# Patient Record
Sex: FEMALE | Race: WHITE | Employment: UNEMPLOYED | URBAN - METROPOLITAN AREA
[De-identification: names, ages, dates, MRNs, and addresses within clinical notes are randomized per-mention and may not be internally consistent; named-entity substitution may affect disease eponyms.]

---

## 2018-03-14 ENCOUNTER — HOSPITAL ENCOUNTER (EMERGENCY)
Age: 33
Discharge: LEFT BEFORE DISCHARGE | End: 2018-03-14
Payer: MEDICAID

## 2018-03-14 VITALS
DIASTOLIC BLOOD PRESSURE: 71 MMHG | BODY MASS INDEX: 21.71 KG/M2 | TEMPERATURE: 97.5 F | WEIGHT: 118 LBS | HEART RATE: 78 BPM | RESPIRATION RATE: 12 BRPM | HEIGHT: 62 IN | SYSTOLIC BLOOD PRESSURE: 101 MMHG | OXYGEN SATURATION: 98 %

## 2018-10-03 ENCOUNTER — HOSPITAL ENCOUNTER (EMERGENCY)
Age: 33
Discharge: HOME | End: 2018-10-03
Attending: FAMILY MEDICINE
Payer: MEDICAID

## 2018-10-03 VITALS
WEIGHT: 117 LBS | TEMPERATURE: 98.4 F | OXYGEN SATURATION: 97 % | BODY MASS INDEX: 21.53 KG/M2 | RESPIRATION RATE: 16 BRPM | HEIGHT: 62 IN | SYSTOLIC BLOOD PRESSURE: 102 MMHG | HEART RATE: 80 BPM | DIASTOLIC BLOOD PRESSURE: 58 MMHG

## 2018-10-03 DIAGNOSIS — N30.00 ACUTE CYSTITIS WITHOUT HEMATURIA: ICD-10-CM

## 2018-10-03 DIAGNOSIS — R30.0 DYSURIA: Primary | ICD-10-CM

## 2018-10-03 LAB
BACTERIA: ABNORMAL /HPF
BILIRUBIN URINE: ABNORMAL
BLOOD, URINE: ABNORMAL
CLARITY: ABNORMAL
COLOR: ABNORMAL
GLUCOSE URINE: NEGATIVE MG/DL
HCG(URINE) PREGNANCY TEST: NEGATIVE
KETONES, URINE: ABNORMAL MG/DL
LEUKOCYTE ESTERASE, URINE: ABNORMAL
NITRITE, URINE: POSITIVE
PH UA: 6 (ref 5–9)
PROTEIN UA: >=300 MG/DL
RBC UA: >20 /HPF (ref 0–2)
SPECIFIC GRAVITY UA: >=1.03 (ref 1–1.03)
UROBILINOGEN, URINE: 1 E.U./DL
WBC UA: >20 /HPF (ref 0–5)

## 2018-10-03 PROCEDURE — 81001 URINALYSIS AUTO W/SCOPE: CPT

## 2018-10-03 PROCEDURE — 99283 EMERGENCY DEPT VISIT LOW MDM: CPT

## 2018-10-03 PROCEDURE — 81025 URINE PREGNANCY TEST: CPT

## 2018-10-03 RX ORDER — ALPRAZOLAM 1 MG/1
2 TABLET ORAL 2 TIMES DAILY
COMMUNITY

## 2018-10-03 RX ORDER — SULFAMETHOXAZOLE AND TRIMETHOPRIM 800; 160 MG/1; MG/1
1 TABLET ORAL 2 TIMES DAILY
Qty: 10 TABLET | Refills: 0 | Status: SHIPPED | OUTPATIENT
Start: 2018-10-03 | End: 2018-10-08

## 2018-10-03 ASSESSMENT — ENCOUNTER SYMPTOMS: BACK PAIN: 0

## 2018-10-03 ASSESSMENT — PAIN - FUNCTIONAL ASSESSMENT: PAIN_FUNCTIONAL_ASSESSMENT: 0-10

## 2018-10-03 ASSESSMENT — PAIN DESCRIPTION - PAIN TYPE: TYPE: ACUTE PAIN

## 2018-10-03 ASSESSMENT — PAIN SCALES - GENERAL: PAINLEVEL_OUTOF10: 5

## 2018-10-03 NOTE — ED PROVIDER NOTES
medications have been reviewed. Allergies: Patient has no known allergies. -------------------------------------------------- RESULTS -------------------------------------------------  No results found for this visit on 10/03/18. No orders to display       ------------------------- NURSING NOTES AND VITALS REVIEWED ---------------------------   The nursing notes within the ED encounter and vital signs as below have been reviewed. BP (!) 102/58   Pulse 80   Temp 98.4 °F (36.9 °C) (Oral)   Resp 16   Ht 5' 2\" (1.575 m)   Wt 117 lb (53.1 kg)   LMP 10/03/2018   SpO2 97%   Breastfeeding? No   BMI 21.40 kg/m²   Oxygen Saturation Interpretation: Normal      ------------------------------------------ PROGRESS NOTES ------------------------------------------   I have spoken with the patient and discussed todays results, in addition to providing specific details for the plan of care and counseling regarding the diagnosis and prognosis. Their questions are answered at this time and they are agreeable with the plan.      --------------------------------- ADDITIONAL PROVIDER NOTES ---------------------------------     1. Dysuria    2. Acute cystitis without hematuria           This patient is stable for discharge. I have shared the specific conditions for return, as well as the importance of follow-up.              Es Overall, DO  10/03/18 5299

## 2018-10-08 ENCOUNTER — HOSPITAL ENCOUNTER (EMERGENCY)
Age: 33
Discharge: HOME | End: 2018-10-08
Payer: MEDICAID

## 2018-10-08 VITALS
WEIGHT: 117 LBS | HEIGHT: 62 IN | OXYGEN SATURATION: 99 % | DIASTOLIC BLOOD PRESSURE: 49 MMHG | BODY MASS INDEX: 21.53 KG/M2 | TEMPERATURE: 97 F | RESPIRATION RATE: 17 BRPM | SYSTOLIC BLOOD PRESSURE: 96 MMHG | HEART RATE: 76 BPM

## 2018-10-08 DIAGNOSIS — B02.9 HERPES ZOSTER WITHOUT COMPLICATION: Primary | ICD-10-CM

## 2018-10-08 PROCEDURE — 99282 EMERGENCY DEPT VISIT SF MDM: CPT

## 2018-10-08 RX ORDER — ACYCLOVIR 800 MG/1
800 TABLET ORAL
Qty: 50 TABLET | Refills: 0 | Status: SHIPPED | OUTPATIENT
Start: 2018-10-08 | End: 2018-10-18

## 2018-10-08 RX ORDER — OXYCODONE HYDROCHLORIDE AND ACETAMINOPHEN 5; 325 MG/1; MG/1
1 TABLET ORAL EVERY 6 HOURS PRN
Qty: 10 TABLET | Refills: 0 | Status: SHIPPED | OUTPATIENT
Start: 2018-10-08 | End: 2018-10-11

## 2018-10-08 ASSESSMENT — PAIN DESCRIPTION - PAIN TYPE: TYPE: ACUTE PAIN

## 2018-10-08 ASSESSMENT — PAIN DESCRIPTION - LOCATION: LOCATION: FACE

## 2018-10-08 ASSESSMENT — PAIN SCALES - GENERAL: PAINLEVEL_OUTOF10: 7

## 2019-05-31 ENCOUNTER — HOSPITAL ENCOUNTER (EMERGENCY)
Age: 34
Discharge: HOME | End: 2019-05-31
Payer: MEDICAID

## 2019-05-31 VITALS
DIASTOLIC BLOOD PRESSURE: 64 MMHG | RESPIRATION RATE: 18 BRPM | TEMPERATURE: 97.7 F | SYSTOLIC BLOOD PRESSURE: 113 MMHG | OXYGEN SATURATION: 93 % | HEART RATE: 76 BPM

## 2019-05-31 DIAGNOSIS — N39.0 URINARY TRACT INFECTION WITH HEMATURIA, SITE UNSPECIFIED: Primary | ICD-10-CM

## 2019-05-31 DIAGNOSIS — R31.9 URINARY TRACT INFECTION WITH HEMATURIA, SITE UNSPECIFIED: Primary | ICD-10-CM

## 2019-05-31 LAB
BACTERIA: ABNORMAL /HPF
BILIRUBIN URINE: NEGATIVE
BLOOD, URINE: ABNORMAL
CLARITY: CLEAR
COLOR: YELLOW
EPITHELIAL CELLS, UA: ABNORMAL /HPF
GLUCOSE URINE: NEGATIVE MG/DL
HCG, URINE, POC: NEGATIVE
KETONES, URINE: NEGATIVE MG/DL
LEUKOCYTE ESTERASE, URINE: ABNORMAL
Lab: NORMAL
NEGATIVE QC PASS/FAIL: NORMAL
NITRITE, URINE: NEGATIVE
PH UA: 6 (ref 5–9)
POSITIVE QC PASS/FAIL: NORMAL
PROTEIN UA: NEGATIVE MG/DL
RBC UA: ABNORMAL /HPF (ref 0–2)
SPECIFIC GRAVITY UA: 1.01 (ref 1–1.03)
UROBILINOGEN, URINE: 0.2 E.U./DL
WBC UA: >20 /HPF (ref 0–5)

## 2019-05-31 PROCEDURE — 99283 EMERGENCY DEPT VISIT LOW MDM: CPT

## 2019-05-31 PROCEDURE — 87088 URINE BACTERIA CULTURE: CPT

## 2019-05-31 PROCEDURE — 87186 SC STD MICRODIL/AGAR DIL: CPT

## 2019-05-31 PROCEDURE — 6370000000 HC RX 637 (ALT 250 FOR IP): Performed by: NURSE PRACTITIONER

## 2019-05-31 PROCEDURE — 81001 URINALYSIS AUTO W/SCOPE: CPT

## 2019-05-31 RX ORDER — CEFDINIR 300 MG/1
300 CAPSULE ORAL 2 TIMES DAILY
Qty: 14 CAPSULE | Refills: 0 | Status: SHIPPED | OUTPATIENT
Start: 2019-05-31 | End: 2019-06-07

## 2019-05-31 RX ORDER — CEFDINIR 300 MG/1
300 CAPSULE ORAL ONCE
Status: COMPLETED | OUTPATIENT
Start: 2019-05-31 | End: 2019-05-31

## 2019-05-31 RX ORDER — PHENAZOPYRIDINE HYDROCHLORIDE 100 MG/1
100 TABLET, FILM COATED ORAL 3 TIMES DAILY PRN
Qty: 9 TABLET | Refills: 0 | Status: SHIPPED | OUTPATIENT
Start: 2019-05-31 | End: 2019-06-03

## 2019-05-31 RX ADMIN — CEFDINIR 300 MG: 300 CAPSULE ORAL at 22:47

## 2019-06-02 LAB
ORGANISM: ABNORMAL
URINE CULTURE, ROUTINE: ABNORMAL
URINE CULTURE, ROUTINE: ABNORMAL

## 2019-06-05 NOTE — ED PROVIDER NOTES
Independent   HPI:  6/5/19, Time: 6:51 AM         Charlene Landeros is a 26 y.o. female presenting to the ED for concerns regarding urinary frequency. Patient reports her last day she's been having urinary frequency. States that she will void small amounts. Initially she reported to nursing staff that she is having urinary retention despite going to the bathroom 3 hours prior. Patient denies any back pain denies any abdominal pain denies any fevers or chills. Review of Systems:   Pertinent positives and negatives are stated within HPI, all other systems reviewed and are negative.          --------------------------------------------- PAST HISTORY ---------------------------------------------  Past Medical History:  has a past medical history of Bipolar 1 disorder ([facility] [address] Chronic neck pain, and Migraines. Past Surgical History:  has a past surgical history that includes hernia repair. Social History:  reports that she has been smoking cigarettes. She has a 7.50 pack-year smoking history. She has never used smokeless tobacco. She reports that she does not drink alcohol or use drugs. Family History: family history includes Cancer in her paternal grandfather and paternal grandmother; Heart Disease in her maternal grandmother. The patients home medications have been reviewed. Allergies: Patient has no known allergies.     -------------------------------------------------- RESULTS -------------------------------------------------  All laboratory and radiology results have been personally reviewed by myself   LABS:  Results for orders placed or performed during the hospital encounter of 05/31/19   Urine Culture   Result Value Ref Range    Urine Culture, Routine      Organism Escherichia coli (A)     Urine Culture, Routine >100,000 CFU/ml        Susceptibility    Escherichia coli - BACTERIAL SUSCEPTIBILITY PANEL BY MIKALA     ampicillin =^4 Sensitive mcg/mL     ceFAZolin <=^4 Sensitive mcg/mL     cefepime bilaterally, no wheezes, rales, or rhonchi. Not in respiratory distress  Cardiovascular:  Regular rate and rhythm, no murmurs, gallops, or rubs. 2+ distal pulses  Abdomen: Soft, non tender, non distended, negative for CVA tenderness  Extremities: Moves all extremities x 4. Warm and well perfused  Skin: warm and dry without rash  Neurologic: GCS 15,  Psych: Normal Affect      ------------------------------ ED COURSE/MEDICAL DECISION MAKING----------------------  Medications   cefdinir (OMNICEF) capsule 300 mg (300 mg Oral Given 5/31/19 2247)         ED COURSE:       Medical Decision Making: Plan will be for urinalysis. Urine showing moderate leukocytes. It is also showing large amount of blood. Urine pregnancy negative. Patient positive for urinary tract infection. Patient will be started on Omnicef. She was provided with first dose here in the emergency department. Patient will be discharged home with Omnicef and Pyridium. She was educated on safe sex practices and follow-up care regarding urinary tract infection as well as good hydration with water. Patient also made aware when to return back to the emergency department with complete understanding. Patient neurovascularly and hemodynamically intact. Patient safely discharged home       Counseling: The emergency provider has spoken with the patient and discussed todays results, in addition to providing specific details for the plan of care and counseling regarding the diagnosis and prognosis. Questions are answered at this time and they are agreeable with the plan.      --------------------------------- IMPRESSION AND DISPOSITION ---------------------------------    IMPRESSION  1. Urinary tract infection with hematuria, site unspecified        DISPOSITION  Disposition: Discharge to home  Patient condition is good      NOTE: This report was transcribed using voice recognition software.  Every effort was made to ensure accuracy; however, inadvertent computerized transcription errors may be present     SOLOMON Staley - ALEJANDRO  06/05/19 6333

## 2019-11-17 ENCOUNTER — HOSPITAL ENCOUNTER (EMERGENCY)
Age: 34
Discharge: HOME | End: 2019-11-17
Attending: EMERGENCY MEDICINE
Payer: MEDICAID

## 2019-11-17 ENCOUNTER — APPOINTMENT (OUTPATIENT)
Dept: GENERAL RADIOLOGY | Age: 34
End: 2019-11-17
Payer: MEDICAID

## 2019-11-17 VITALS
BODY MASS INDEX: 21.35 KG/M2 | RESPIRATION RATE: 16 BRPM | WEIGHT: 116 LBS | DIASTOLIC BLOOD PRESSURE: 52 MMHG | HEIGHT: 62 IN | OXYGEN SATURATION: 99 % | HEART RATE: 67 BPM | SYSTOLIC BLOOD PRESSURE: 94 MMHG | TEMPERATURE: 97.8 F

## 2019-11-17 DIAGNOSIS — J11.1 INFLUENZA WITH RESPIRATORY MANIFESTATION OTHER THAN PNEUMONIA: Primary | ICD-10-CM

## 2019-11-17 LAB
ANION GAP SERPL CALCULATED.3IONS-SCNC: 9 MMOL/L (ref 7–16)
BUN BLDV-MCNC: 13 MG/DL (ref 6–20)
CALCIUM SERPL-MCNC: 9.2 MG/DL (ref 8.6–10.2)
CHLORIDE BLD-SCNC: 108 MMOL/L (ref 98–107)
CO2: 23 MMOL/L (ref 22–29)
CREAT SERPL-MCNC: 0.6 MG/DL (ref 0.5–1)
GFR AFRICAN AMERICAN: >60
GFR NON-AFRICAN AMERICAN: >60 ML/MIN/1.73
GLUCOSE BLD-MCNC: 97 MG/DL (ref 74–99)
HCT VFR BLD CALC: 36.3 % (ref 34–48)
HEMOGLOBIN: 12.4 G/DL (ref 11.5–15.5)
INFLUENZA A BY PCR: NOT DETECTED
INFLUENZA B BY PCR: DETECTED
MCH RBC QN AUTO: 30.7 PG (ref 26–35)
MCHC RBC AUTO-ENTMCNC: 34.2 % (ref 32–34.5)
MCV RBC AUTO: 89.9 FL (ref 80–99.9)
PDW BLD-RTO: 12.3 FL (ref 11.5–15)
PLATELET # BLD: 144 E9/L (ref 130–450)
PMV BLD AUTO: 11.3 FL (ref 7–12)
POTASSIUM SERPL-SCNC: 4 MMOL/L (ref 3.5–5)
PRO-BNP: 126 PG/ML (ref 0–125)
RBC # BLD: 4.04 E12/L (ref 3.5–5.5)
SODIUM BLD-SCNC: 140 MMOL/L (ref 132–146)
STREP GRP A PCR: NEGATIVE
TROPONIN: <0.01 NG/ML (ref 0–0.03)
WBC # BLD: 4 E9/L (ref 4.5–11.5)

## 2019-11-17 PROCEDURE — 99285 EMERGENCY DEPT VISIT HI MDM: CPT

## 2019-11-17 PROCEDURE — 83880 ASSAY OF NATRIURETIC PEPTIDE: CPT

## 2019-11-17 PROCEDURE — 6360000002 HC RX W HCPCS: Performed by: EMERGENCY MEDICINE

## 2019-11-17 PROCEDURE — 84484 ASSAY OF TROPONIN QUANT: CPT

## 2019-11-17 PROCEDURE — 80048 BASIC METABOLIC PNL TOTAL CA: CPT

## 2019-11-17 PROCEDURE — 85027 COMPLETE CBC AUTOMATED: CPT

## 2019-11-17 PROCEDURE — 96374 THER/PROPH/DIAG INJ IV PUSH: CPT

## 2019-11-17 PROCEDURE — 93005 ELECTROCARDIOGRAM TRACING: CPT | Performed by: PHYSICIAN ASSISTANT

## 2019-11-17 PROCEDURE — 36415 COLL VENOUS BLD VENIPUNCTURE: CPT

## 2019-11-17 PROCEDURE — 87880 STREP A ASSAY W/OPTIC: CPT

## 2019-11-17 PROCEDURE — 71046 X-RAY EXAM CHEST 2 VIEWS: CPT

## 2019-11-17 PROCEDURE — 87502 INFLUENZA DNA AMP PROBE: CPT

## 2019-11-17 PROCEDURE — 94664 DEMO&/EVAL PT USE INHALER: CPT

## 2019-11-17 PROCEDURE — 2500000003 HC RX 250 WO HCPCS: Performed by: EMERGENCY MEDICINE

## 2019-11-17 RX ORDER — PREDNISONE 50 MG/1
TABLET ORAL
Qty: 5 TABLET | Refills: 0 | Status: SHIPPED | OUTPATIENT
Start: 2019-11-17 | End: 2020-03-10 | Stop reason: ALTCHOICE

## 2019-11-17 RX ORDER — ALBUTEROL SULFATE 90 UG/1
2 AEROSOL, METERED RESPIRATORY (INHALATION) EVERY 6 HOURS PRN
Qty: 1 INHALER | Refills: 0 | Status: SHIPPED | OUTPATIENT
Start: 2019-11-17 | End: 2020-09-25

## 2019-11-17 RX ORDER — METHYLPREDNISOLONE SODIUM SUCCINATE 125 MG/2ML
125 INJECTION, POWDER, LYOPHILIZED, FOR SOLUTION INTRAMUSCULAR; INTRAVENOUS ONCE
Status: COMPLETED | OUTPATIENT
Start: 2019-11-17 | End: 2019-11-17

## 2019-11-17 RX ORDER — LIDOCAINE HYDROCHLORIDE 10 MG/ML
5 INJECTION, SOLUTION EPIDURAL; INFILTRATION; INTRACAUDAL; PERINEURAL ONCE
Status: COMPLETED | OUTPATIENT
Start: 2019-11-17 | End: 2019-11-17

## 2019-11-17 RX ADMIN — LIDOCAINE HYDROCHLORIDE 5 ML: 10 INJECTION, SOLUTION EPIDURAL; INFILTRATION; INTRACAUDAL; PERINEURAL at 20:21

## 2019-11-17 RX ADMIN — METHYLPREDNISOLONE SODIUM SUCCINATE 125 MG: 125 INJECTION, POWDER, FOR SOLUTION INTRAMUSCULAR; INTRAVENOUS at 20:14

## 2019-11-17 ASSESSMENT — ENCOUNTER SYMPTOMS
SHORTNESS OF BREATH: 1
BACK PAIN: 0
ABDOMINAL PAIN: 0
COUGH: 1

## 2019-11-18 LAB
EKG ATRIAL RATE: 66 BPM
EKG P AXIS: 40 DEGREES
EKG P-R INTERVAL: 134 MS
EKG Q-T INTERVAL: 388 MS
EKG QRS DURATION: 72 MS
EKG QTC CALCULATION (BAZETT): 406 MS
EKG R AXIS: 93 DEGREES
EKG T AXIS: 67 DEGREES
EKG VENTRICULAR RATE: 66 BPM

## 2019-11-18 PROCEDURE — 93010 ELECTROCARDIOGRAM REPORT: CPT | Performed by: INTERNAL MEDICINE

## 2019-12-14 ENCOUNTER — APPOINTMENT (OUTPATIENT)
Dept: GENERAL RADIOLOGY | Age: 34
End: 2019-12-14
Payer: MEDICAID

## 2019-12-14 ENCOUNTER — HOSPITAL ENCOUNTER (EMERGENCY)
Age: 34
Discharge: HOME | End: 2019-12-14
Payer: MEDICAID

## 2019-12-14 VITALS
WEIGHT: 116 LBS | HEART RATE: 80 BPM | RESPIRATION RATE: 14 BRPM | BODY MASS INDEX: 21.35 KG/M2 | OXYGEN SATURATION: 98 % | HEIGHT: 62 IN | DIASTOLIC BLOOD PRESSURE: 79 MMHG | TEMPERATURE: 97.6 F | SYSTOLIC BLOOD PRESSURE: 118 MMHG

## 2019-12-14 DIAGNOSIS — M62.838 SPASM OF MUSCLE: ICD-10-CM

## 2019-12-14 DIAGNOSIS — M54.6 PAIN IN THORACIC SPINE: ICD-10-CM

## 2019-12-14 DIAGNOSIS — S39.012A BACK STRAIN, INITIAL ENCOUNTER: Primary | ICD-10-CM

## 2019-12-14 LAB
HCG, URINE, POC: NEGATIVE
Lab: NORMAL
NEGATIVE QC PASS/FAIL: NORMAL
POSITIVE QC PASS/FAIL: NORMAL

## 2019-12-14 PROCEDURE — 99283 EMERGENCY DEPT VISIT LOW MDM: CPT

## 2019-12-14 PROCEDURE — 72074 X-RAY EXAM THORAC SPINE4/>VW: CPT

## 2019-12-14 PROCEDURE — 6360000002 HC RX W HCPCS: Performed by: NURSE PRACTITIONER

## 2019-12-14 PROCEDURE — 71101 X-RAY EXAM UNILAT RIBS/CHEST: CPT

## 2019-12-14 PROCEDURE — 96372 THER/PROPH/DIAG INJ SC/IM: CPT

## 2019-12-14 RX ORDER — NAPROXEN 500 MG/1
500 TABLET ORAL 2 TIMES DAILY PRN
Qty: 14 TABLET | Refills: 0 | Status: SHIPPED | OUTPATIENT
Start: 2019-12-14 | End: 2020-09-25

## 2019-12-14 RX ORDER — KETOROLAC TROMETHAMINE 30 MG/ML
30 INJECTION, SOLUTION INTRAMUSCULAR; INTRAVENOUS ONCE
Status: COMPLETED | OUTPATIENT
Start: 2019-12-14 | End: 2019-12-14

## 2019-12-14 RX ORDER — LIDOCAINE 50 MG/G
1 PATCH TOPICAL DAILY
Qty: 10 PATCH | Refills: 0 | Status: SHIPPED | OUTPATIENT
Start: 2019-12-14 | End: 2019-12-24

## 2019-12-14 RX ORDER — ORPHENADRINE CITRATE 100 MG/1
100 TABLET, EXTENDED RELEASE ORAL 2 TIMES DAILY PRN
Qty: 20 TABLET | Refills: 0 | Status: SHIPPED | OUTPATIENT
Start: 2019-12-14 | End: 2019-12-24

## 2019-12-14 RX ADMIN — KETOROLAC TROMETHAMINE 30 MG: 30 INJECTION, SOLUTION INTRAMUSCULAR at 10:26

## 2019-12-14 ASSESSMENT — PAIN SCALES - GENERAL: PAINLEVEL_OUTOF10: 8

## 2020-03-10 ENCOUNTER — HOSPITAL ENCOUNTER (EMERGENCY)
Age: 35
Discharge: HOME | End: 2020-03-10
Payer: MEDICAID

## 2020-03-10 VITALS
RESPIRATION RATE: 16 BRPM | DIASTOLIC BLOOD PRESSURE: 51 MMHG | OXYGEN SATURATION: 96 % | SYSTOLIC BLOOD PRESSURE: 135 MMHG | HEART RATE: 93 BPM | WEIGHT: 116 LBS | HEIGHT: 63 IN | TEMPERATURE: 97.9 F | BODY MASS INDEX: 20.55 KG/M2

## 2020-03-10 PROCEDURE — 6370000000 HC RX 637 (ALT 250 FOR IP): Performed by: NURSE PRACTITIONER

## 2020-03-10 PROCEDURE — 99282 EMERGENCY DEPT VISIT SF MDM: CPT

## 2020-03-10 RX ORDER — ACYCLOVIR 800 MG/1
800 TABLET ORAL
Qty: 50 TABLET | Refills: 0 | Status: SHIPPED | OUTPATIENT
Start: 2020-03-10 | End: 2020-03-20

## 2020-03-10 RX ORDER — PREDNISONE 20 MG/1
60 TABLET ORAL ONCE
Status: COMPLETED | OUTPATIENT
Start: 2020-03-10 | End: 2020-03-10

## 2020-03-10 RX ORDER — PREDNISONE 10 MG/1
TABLET ORAL
Qty: 20 TABLET | Refills: 0 | Status: SHIPPED | OUTPATIENT
Start: 2020-03-10 | End: 2020-03-20

## 2020-03-10 RX ORDER — ACYCLOVIR 800 MG/1
800 TABLET ORAL ONCE
Status: COMPLETED | OUTPATIENT
Start: 2020-03-10 | End: 2020-03-10

## 2020-03-10 RX ADMIN — ACYCLOVIR 800 MG: 800 TABLET ORAL at 22:21

## 2020-03-10 RX ADMIN — PREDNISONE 60 MG: 20 TABLET ORAL at 22:22

## 2020-03-10 ASSESSMENT — PAIN DESCRIPTION - LOCATION: LOCATION: FACE

## 2020-03-10 ASSESSMENT — PAIN DESCRIPTION - ONSET: ONSET: ON-GOING

## 2020-03-10 ASSESSMENT — PAIN DESCRIPTION - ORIENTATION: ORIENTATION: LEFT

## 2020-03-10 ASSESSMENT — PAIN DESCRIPTION - DESCRIPTORS: DESCRIPTORS: TINGLING;PINS AND NEEDLES

## 2020-03-10 ASSESSMENT — PAIN DESCRIPTION - FREQUENCY: FREQUENCY: INTERMITTENT

## 2020-03-10 ASSESSMENT — PAIN DESCRIPTION - PAIN TYPE: TYPE: ACUTE PAIN

## 2020-03-10 ASSESSMENT — PAIN SCALES - GENERAL: PAINLEVEL_OUTOF10: 7

## 2020-03-11 NOTE — ED PROVIDER NOTES
Independent   HPI:  3/10/20, Time: 11:47 PM EDT         Linnea Osman is a 37 y.o. female presenting to the ED for concerns regarding the start of shingles. Patient presenting with 1 isolated herpetic lesion to the right forehead. Patient reports history of shingles. Patient reports symptoms started today. She does express being under increased amounts of stress. Denies any visual disturbances denies any other areas of lesion. Patient not currently on any antiviral medications. She denies fever she denies any unusual numbness or tingling or facial paresthesia or paralysis. She reports normal state of health. Review of Systems:   Pertinent positives and negatives are stated within HPI, all other systems reviewed and are negative.          --------------------------------------------- PAST HISTORY ---------------------------------------------  Past Medical History:  has a past medical history of Bipolar 1 disorder ([facility] [address] Chronic neck pain, Migraines, and Scoliosis. Past Surgical History:  has a past surgical history that includes hernia repair. Social History:  reports that she has been smoking cigarettes. She has a 7.50 pack-year smoking history. She has never used smokeless tobacco. She reports that she does not drink alcohol or use drugs. Family History: family history includes Cancer in her paternal grandfather and paternal grandmother; Heart Disease in her maternal grandmother. The patients home medications have been reviewed. Allergies: Patient has no known allergies. -------------------------------------------------- RESULTS -------------------------------------------------  All laboratory and radiology results have been personally reviewed by myself   LABS:  No results found for this visit on 03/10/20.     RADIOLOGY:  Interpreted by Radiologist.  No orders to display       ------------------------- NURSING NOTES AND VITALS REVIEWED ---------------------------   The nursing notes within the ED encounter and vital signs as below have been reviewed. BP (!) 135/51   Pulse 93   Temp 97.9 °F (36.6 °C)   Resp 16   Ht 5' 2.5\" (1.588 m)   Wt 116 lb (52.6 kg)   LMP 03/04/2020 (Exact Date)   SpO2 96%   BMI 20.88 kg/m²   Oxygen Saturation Interpretation: Normal      ---------------------------------------------------PHYSICAL EXAM--------------------------------------      Constitutional/General: Alert and oriented x3, well appearing, non toxic in NAD  Head: Normocephalic and atraumatic  Eyes: PERRL, EOMI  Mouth: Oropharynx clear, handling secretions, no trismus  Neck: Supple, full ROM,   Pulmonary: Lungs clear to auscultation bilaterally, no wheezes, rales, or rhonchi. Not in respiratory distress  Cardiovascular:  Regular rate and rhythm, no murmurs, gallops, or rubs. 2+ distal pulses  Abdomen: Soft, non tender, non distended,   Extremities: Moves all extremities x 4. Warm and well perfused  Skin: warm and dry without rash, Patient does have 1 solitary hepatic lesion to V1 dermatome. Neurologic: GCS 15,  Psych: Normal Affect      ------------------------------ ED COURSE/MEDICAL DECISION MAKING----------------------  Medications   acyclovir (ZOVIRAX) tablet 800 mg (800 mg Oral Given 3/10/20 2221)   predniSONE (DELTASONE) tablet 60 mg (60 mg Oral Given 3/10/20 2222)         ED COURSE:       Medical Decision Making:    Plan will be for clinical evaluation. Patient does have 1 solitary hepatic lesion to V1 dermatome. Patient was provided with Zovirax 800 mg tablet as well as prednisone 60 mg. Patient was educated on good follow-up care as well as when to return back to the emergency department. Patient does not have any ocular involvement. Patient will be sent home with acyclovir. Patient expressed understanding and safely discharged home    Counseling:    The emergency provider has spoken with the patient and discussed todays results, in addition to providing specific details for the plan

## 2020-07-03 ENCOUNTER — HOSPITAL ENCOUNTER (EMERGENCY)
Age: 35
Discharge: HOME | End: 2020-07-03
Payer: MEDICAID

## 2020-07-03 VITALS
BODY MASS INDEX: 20.55 KG/M2 | SYSTOLIC BLOOD PRESSURE: 116 MMHG | HEART RATE: 86 BPM | WEIGHT: 116 LBS | HEIGHT: 63 IN | TEMPERATURE: 98 F | RESPIRATION RATE: 18 BRPM | DIASTOLIC BLOOD PRESSURE: 70 MMHG | OXYGEN SATURATION: 98 %

## 2020-07-03 PROCEDURE — 6360000002 HC RX W HCPCS: Performed by: NURSE PRACTITIONER

## 2020-07-03 PROCEDURE — 10060 I&D ABSCESS SIMPLE/SINGLE: CPT

## 2020-07-03 PROCEDURE — 99282 EMERGENCY DEPT VISIT SF MDM: CPT

## 2020-07-03 PROCEDURE — 6370000000 HC RX 637 (ALT 250 FOR IP): Performed by: NURSE PRACTITIONER

## 2020-07-03 PROCEDURE — 90715 TDAP VACCINE 7 YRS/> IM: CPT | Performed by: NURSE PRACTITIONER

## 2020-07-03 PROCEDURE — 90471 IMMUNIZATION ADMIN: CPT | Performed by: NURSE PRACTITIONER

## 2020-07-03 RX ORDER — CEPHALEXIN 500 MG/1
500 CAPSULE ORAL 2 TIMES DAILY
Qty: 20 CAPSULE | Refills: 0 | Status: SHIPPED | OUTPATIENT
Start: 2020-07-03 | End: 2020-07-03 | Stop reason: CLARIF

## 2020-07-03 RX ORDER — CEPHALEXIN 500 MG/1
500 CAPSULE ORAL 4 TIMES DAILY
Qty: 40 CAPSULE | Refills: 0 | Status: SHIPPED | OUTPATIENT
Start: 2020-07-03 | End: 2020-07-13

## 2020-07-03 RX ORDER — SULFAMETHOXAZOLE AND TRIMETHOPRIM 800; 160 MG/1; MG/1
1 TABLET ORAL ONCE
Status: COMPLETED | OUTPATIENT
Start: 2020-07-03 | End: 2020-07-03

## 2020-07-03 RX ORDER — OXYCODONE HYDROCHLORIDE AND ACETAMINOPHEN 5; 325 MG/1; MG/1
1 TABLET ORAL ONCE
Status: COMPLETED | OUTPATIENT
Start: 2020-07-03 | End: 2020-07-03

## 2020-07-03 RX ORDER — CEPHALEXIN 500 MG/1
500 CAPSULE ORAL ONCE
Status: COMPLETED | OUTPATIENT
Start: 2020-07-03 | End: 2020-07-03

## 2020-07-03 RX ORDER — SULFAMETHOXAZOLE AND TRIMETHOPRIM 800; 160 MG/1; MG/1
2 TABLET ORAL 2 TIMES DAILY
Qty: 40 TABLET | Refills: 0 | Status: SHIPPED | OUTPATIENT
Start: 2020-07-03 | End: 2020-07-13

## 2020-07-03 RX ADMIN — OXYCODONE AND ACETAMINOPHEN 1 TABLET: 5; 325 TABLET ORAL at 03:28

## 2020-07-03 RX ADMIN — CEPHALEXIN 500 MG: 500 CAPSULE ORAL at 05:12

## 2020-07-03 RX ADMIN — TETANUS TOXOID, REDUCED DIPHTHERIA TOXOID AND ACELLULAR PERTUSSIS VACCINE, ADSORBED 0.5 ML: 5; 2.5; 8; 8; 2.5 SUSPENSION INTRAMUSCULAR at 05:12

## 2020-07-03 RX ADMIN — SULFAMETHOXAZOLE AND TRIMETHOPRIM 1 TABLET: 800; 160 TABLET ORAL at 05:12

## 2020-07-03 ASSESSMENT — PAIN DESCRIPTION - PAIN TYPE: TYPE: ACUTE PAIN

## 2020-07-03 ASSESSMENT — PAIN SCALES - GENERAL
PAINLEVEL_OUTOF10: 10
PAINLEVEL_OUTOF10: 10

## 2020-07-03 NOTE — ED NOTES
Bed: 08  Expected date:   Expected time:   Means of arrival:   Comments:  triage     Madeleine Qiu RN  07/03/20 0159

## 2020-07-03 NOTE — ED PROVIDER NOTES
ED physician  HPI:  7/3/20, Time: 3:15 AM EDT         Jonh Pollard is a 37 y.o. female presenting to the ED for an abscess on her right buttocks. Reports that she has a history of shingles and felt that it was a shingles lesion. Upon examination, an area of abcess with noted erythema and swelling, located to the right buttocks, no streaking noted. The patient reports that she stuck a needle into the abscess. Patient reports that she did not get any relief after the puncture into abscess and decided to report to the emergency department. Denies any fever, malaise or chills. Denies any discharge from abscess site on right buttocks. Patient reports pain but does not report that it radiates anywhere. Describes pain as sore with some heaviness. Denies any shortness of breath, chest pain, nausea vomiting or diarrhea. Patient denies any recent contact with anyone ill. Review of Systems:   Pertinent positives and negatives are stated within HPI, all other systems reviewed and are negative.          --------------------------------------------- PAST HISTORY ---------------------------------------------  Past Medical History:  has a past medical history of Bipolar 1 disorder ([facility] [address] Chronic neck pain, Migraines, and Scoliosis. Past Surgical History:  has a past surgical history that includes hernia repair. Social History:  reports that she has been smoking cigarettes. She has a 7.50 pack-year smoking history. She has never used smokeless tobacco. She reports that she does not drink alcohol or use drugs. Family History: family history includes Cancer in her paternal grandfather and paternal grandmother; Heart Disease in her maternal grandmother. The patients home medications have been reviewed. Allergies: Patient has no known allergies.     -------------------------------------------------- RESULTS -------------------------------------------------  All laboratory and radiology results have been personally reviewed by myself   LABS:  No results found for this visit on 07/03/20. RADIOLOGY:  Interpreted by Radiologist.  No orders to display       ------------------------- NURSING NOTES AND VITALS REVIEWED ---------------------------   The nursing notes within the ED encounter and vital signs as below have been reviewed. BP (!) 90/51   Pulse 99   Temp 98.3 °F (36.8 °C) (Skin)   Resp 18   Ht 5' 2.5\" (1.588 m)   Wt 116 lb (52.6 kg)   LMP 06/24/2020   SpO2 97%   BMI 20.88 kg/m²   Oxygen Saturation Interpretation: Normal      ---------------------------------------------------PHYSICAL EXAM--------------------------------------      Constitutional/General: Alert and oriented x3, well appearing, non toxic in NAD, denies any fever, malaise or chills. Head: Normocephalic and atraumatic  Eyes: PERRL, EOMI  Mouth: Oropharynx clear, handling secretions, no trismus  Neck: Supple, full ROM,   Pulmonary: Lungs clear to auscultation bilaterally, no wheezes, rales, or rhonchi. Not in respiratory distress  Cardiovascular:  Regular rate and rhythm, no murmurs, gallops, or rubs. 2+ distal pulses,   Abdomen: Soft, non tender, non distended,   Extremities: Moves all extremities x 4. Warm and well perfused, BLE/BUE strengths 5/5. No evidence of bilateral lower extremity edema. Skin: warm and dry, right abscess noted on the buttock, no streaking, bloody purulent drainage noted from I&D. Neurologic: GCS 15, no nerves II through XII neurovascularly intact, speech clear and appropriate patient. No acute neurovascular deficits noted. Psych: Normal Affect    PROCEDURE  7/3/20       Time: 0500  INCISION AND DRAINAGE  Risks, benefits and alternatives (for applicable procedures below) described. Performed By: SOLOMON العلي CNP.     Indication: Abscess  Informed consent obtained: The patient was counseled regarding the procedure, it's indications, risks, potential complications and alternatives and any questions were answered. Consent was obtained. .  Prep: The skin was cleansed with povidone iodine and draped in a sterile fashion. Anesthetic: The wound area was anesthetized with Lidocaine 1% without epinephrine. Incision: Soft tissue abscess of right buttock was Incised by scalpal and moderate, bloody, purulent fluid was drained. A wound culture was not obtained. The wound  was not irrigated and was not packed with iodoform gauze. The wound was then covered with a sterile dressing. Patient tolerated the procedure well.         ------------------------------ ED COURSE/MEDICAL DECISION MAKING----------------------  Medications   oxyCODONE-acetaminophen (PERCOCET) 5-325 MG per tablet 1 tablet (1 tablet Oral Given 7/3/20 1269)   Tetanus-Diphth-Acell Pertussis (BOOSTRIX) injection 0.5 mL (0.5 mLs Intramuscular Given 7/3/20 0512)   cephALEXin (KEFLEX) capsule 500 mg (500 mg Oral Given 7/3/20 0512)   sulfamethoxazole-trimethoprim (BACTRIM DS;SEPTRA DS) 800-160 MG per tablet 1 tablet (1 tablet Oral Given 7/3/20 7246)         ED COURSE:       Medical Decision Making: Patient presenting with concerns regarding what she calls shingles. Patient reports anytime she gets her shingles she will pop them and they will go away. Patient reports that once again to the right upper inner buttock not at all near the perirectal region that she once again had a single isolated shingle lesion she states she did pop it and in fact put a pin in it. She reports that it actually worsened. States that it is now getting firm painful with notable erythema. Patient educated that this is not shingles that this is an abscess. Patient with what she reports shingles lesion also on the left buttock. Patient does not have any obvious other area of abscess site. Patient does not have any other lesions noted either. Patient was educated on what shingles actually is. Patient does not have any shingles lesion noted at all to her body.   We will provide patient with tetanus, and first dose of oral antibiotics, Keflex and Bactrim. I&D performed at the bedside, patient tolerated procedure. Patient provided to patient regarding abscess care, symptoms of infection and when to return to the emergency department. Patient provided PCP access line provided to patient so she can just get established with a primary care physician. Verbalized understanding of plan of care and discharge instructions. Patient resting comfortably with clean dry dressing noted to abscess site. Patient safe to be discharged. Counseling: The emergency provider has spoken with the patient and discussed todays results, in addition to providing specific details for the plan of care and counseling regarding the diagnosis and prognosis. Questions are answered at this time and they are agreeable with the plan.      --------------------------------- IMPRESSION AND DISPOSITION ---------------------------------    IMPRESSION  1. Abscess        DISPOSITION  Disposition: Discharge to home  Patient condition is good      NOTE: This report was transcribed using voice recognition software.  Every effort was made to ensure accuracy; however, inadvertent computerized transcription errors may be present     SOLOMON Zendejas CNP  07/06/20 2752

## 2020-08-02 ENCOUNTER — APPOINTMENT (OUTPATIENT)
Dept: CT IMAGING | Age: 35
End: 2020-08-02
Payer: MEDICAID

## 2020-08-02 ENCOUNTER — APPOINTMENT (OUTPATIENT)
Dept: GENERAL RADIOLOGY | Age: 35
End: 2020-08-02
Payer: MEDICAID

## 2020-08-02 ENCOUNTER — HOSPITAL ENCOUNTER (EMERGENCY)
Age: 35
Discharge: HOME | End: 2020-08-02
Payer: MEDICAID

## 2020-08-02 VITALS
RESPIRATION RATE: 18 BRPM | SYSTOLIC BLOOD PRESSURE: 113 MMHG | HEART RATE: 83 BPM | TEMPERATURE: 97.5 F | OXYGEN SATURATION: 98 % | DIASTOLIC BLOOD PRESSURE: 62 MMHG | HEIGHT: 62 IN | BODY MASS INDEX: 21.35 KG/M2 | WEIGHT: 116 LBS

## 2020-08-02 LAB
ACETAMINOPHEN LEVEL: <5 MCG/ML (ref 10–30)
ALBUMIN SERPL-MCNC: 4 G/DL (ref 3.5–5.2)
ALP BLD-CCNC: 58 U/L (ref 35–104)
ALT SERPL-CCNC: 19 U/L (ref 0–32)
AMPHETAMINE SCREEN, URINE: NOT DETECTED
ANION GAP SERPL CALCULATED.3IONS-SCNC: 12 MMOL/L (ref 7–16)
AST SERPL-CCNC: 28 U/L (ref 0–31)
BACTERIA: ABNORMAL /HPF
BARBITURATE SCREEN URINE: NOT DETECTED
BASOPHILS ABSOLUTE: 0.08 E9/L (ref 0–0.2)
BASOPHILS RELATIVE PERCENT: 0.9 % (ref 0–2)
BENZODIAZEPINE SCREEN, URINE: POSITIVE
BILIRUB SERPL-MCNC: 0.2 MG/DL (ref 0–1.2)
BILIRUBIN URINE: NEGATIVE
BLOOD, URINE: NEGATIVE
BUN BLDV-MCNC: 24 MG/DL (ref 6–20)
CALCIUM SERPL-MCNC: 9.4 MG/DL (ref 8.6–10.2)
CANNABINOID SCREEN URINE: NOT DETECTED
CHLORIDE BLD-SCNC: 108 MMOL/L (ref 98–107)
CLARITY: CLEAR
CO2: 20 MMOL/L (ref 22–29)
COCAINE METABOLITE SCREEN URINE: NOT DETECTED
COLOR: YELLOW
CREAT SERPL-MCNC: 0.7 MG/DL (ref 0.5–1)
EKG ATRIAL RATE: 77 BPM
EKG P AXIS: 75 DEGREES
EKG P-R INTERVAL: 128 MS
EKG Q-T INTERVAL: 404 MS
EKG QRS DURATION: 82 MS
EKG QTC CALCULATION (BAZETT): 457 MS
EKG R AXIS: 93 DEGREES
EKG T AXIS: 73 DEGREES
EKG VENTRICULAR RATE: 77 BPM
EOSINOPHILS ABSOLUTE: 0.36 E9/L (ref 0.05–0.5)
EOSINOPHILS RELATIVE PERCENT: 4 % (ref 0–6)
ETHANOL: 71 MG/DL (ref 0–0.08)
FENTANYL SCREEN, URINE: POSITIVE
GFR AFRICAN AMERICAN: >60
GFR NON-AFRICAN AMERICAN: >60 ML/MIN/1.73
GLUCOSE BLD-MCNC: 101 MG/DL (ref 74–99)
GLUCOSE URINE: NEGATIVE MG/DL
HCG, URINE, POC: NEGATIVE
HCT VFR BLD CALC: 38.5 % (ref 34–48)
HEMOGLOBIN: 12.9 G/DL (ref 11.5–15.5)
IMMATURE GRANULOCYTES #: 0.04 E9/L
IMMATURE GRANULOCYTES %: 0.4 % (ref 0–5)
KETONES, URINE: NEGATIVE MG/DL
LACTIC ACID: 2.4 MMOL/L (ref 0.5–2.2)
LACTIC ACID: 2.4 MMOL/L (ref 0.5–2.2)
LEUKOCYTE ESTERASE, URINE: NEGATIVE
LYMPHOCYTES ABSOLUTE: 4.71 E9/L (ref 1.5–4)
LYMPHOCYTES RELATIVE PERCENT: 52.6 % (ref 20–42)
Lab: ABNORMAL
Lab: NORMAL
MCH RBC QN AUTO: 30.1 PG (ref 26–35)
MCHC RBC AUTO-ENTMCNC: 33.5 % (ref 32–34.5)
MCV RBC AUTO: 90 FL (ref 80–99.9)
METHADONE SCREEN, URINE: NOT DETECTED
MONOCYTES ABSOLUTE: 0.38 E9/L (ref 0.1–0.95)
MONOCYTES RELATIVE PERCENT: 4.2 % (ref 2–12)
NEGATIVE QC PASS/FAIL: NORMAL
NEUTROPHILS ABSOLUTE: 3.39 E9/L (ref 1.8–7.3)
NEUTROPHILS RELATIVE PERCENT: 37.9 % (ref 43–80)
NITRITE, URINE: NEGATIVE
OPIATE SCREEN URINE: NOT DETECTED
OXYCODONE URINE: NOT DETECTED
PDW BLD-RTO: 12.7 FL (ref 11.5–15)
PH UA: 5 (ref 5–9)
PHENCYCLIDINE SCREEN URINE: NOT DETECTED
PLATELET # BLD: 235 E9/L (ref 130–450)
PMV BLD AUTO: 10.5 FL (ref 7–12)
POSITIVE QC PASS/FAIL: NORMAL
POTASSIUM SERPL-SCNC: 3.4 MMOL/L (ref 3.5–5)
PROTEIN UA: NORMAL MG/DL
RBC # BLD: 4.28 E12/L (ref 3.5–5.5)
RBC UA: ABNORMAL /HPF (ref 0–2)
SALICYLATE, SERUM: <0.3 MG/DL (ref 0–30)
SODIUM BLD-SCNC: 140 MMOL/L (ref 132–146)
SPECIFIC GRAVITY UA: >=1.03 (ref 1–1.03)
TOTAL PROTEIN: 7.2 G/DL (ref 6.4–8.3)
TRICYCLIC ANTIDEPRESSANTS SCREEN SERUM: NEGATIVE NG/ML
TROPONIN: <0.01 NG/ML (ref 0–0.03)
UROBILINOGEN, URINE: 0.2 E.U./DL
WBC # BLD: 9 E9/L (ref 4.5–11.5)
WBC UA: ABNORMAL /HPF (ref 0–5)

## 2020-08-02 PROCEDURE — 99285 EMERGENCY DEPT VISIT HI MDM: CPT

## 2020-08-02 PROCEDURE — 6370000000 HC RX 637 (ALT 250 FOR IP): Performed by: NURSE PRACTITIONER

## 2020-08-02 PROCEDURE — 80307 DRUG TEST PRSMV CHEM ANLYZR: CPT

## 2020-08-02 PROCEDURE — 36415 COLL VENOUS BLD VENIPUNCTURE: CPT

## 2020-08-02 PROCEDURE — 84484 ASSAY OF TROPONIN QUANT: CPT

## 2020-08-02 PROCEDURE — 71045 X-RAY EXAM CHEST 1 VIEW: CPT

## 2020-08-02 PROCEDURE — 80053 COMPREHEN METABOLIC PANEL: CPT

## 2020-08-02 PROCEDURE — 85025 COMPLETE CBC W/AUTO DIFF WBC: CPT

## 2020-08-02 PROCEDURE — 93005 ELECTROCARDIOGRAM TRACING: CPT | Performed by: NURSE PRACTITIONER

## 2020-08-02 PROCEDURE — 72125 CT NECK SPINE W/O DYE: CPT

## 2020-08-02 PROCEDURE — 70450 CT HEAD/BRAIN W/O DYE: CPT

## 2020-08-02 PROCEDURE — 6360000002 HC RX W HCPCS: Performed by: NURSE PRACTITIONER

## 2020-08-02 PROCEDURE — G0480 DRUG TEST DEF 1-7 CLASSES: HCPCS

## 2020-08-02 PROCEDURE — 2580000003 HC RX 258: Performed by: NURSE PRACTITIONER

## 2020-08-02 PROCEDURE — 96374 THER/PROPH/DIAG INJ IV PUSH: CPT

## 2020-08-02 PROCEDURE — 81001 URINALYSIS AUTO W/SCOPE: CPT

## 2020-08-02 PROCEDURE — 83605 ASSAY OF LACTIC ACID: CPT

## 2020-08-02 PROCEDURE — 96361 HYDRATE IV INFUSION ADD-ON: CPT

## 2020-08-02 RX ORDER — CEPHALEXIN 500 MG/1
500 CAPSULE ORAL 2 TIMES DAILY
Qty: 20 CAPSULE | Refills: 0 | Status: SHIPPED | OUTPATIENT
Start: 2020-08-02 | End: 2020-09-25

## 2020-08-02 RX ORDER — 0.9 % SODIUM CHLORIDE 0.9 %
1000 INTRAVENOUS SOLUTION INTRAVENOUS ONCE
Status: COMPLETED | OUTPATIENT
Start: 2020-08-02 | End: 2020-08-02

## 2020-08-02 RX ADMIN — POTASSIUM BICARBONATE 20 MEQ: 782 TABLET, EFFERVESCENT ORAL at 02:18

## 2020-08-02 RX ADMIN — CEFTRIAXONE 1 G: 1 INJECTION, POWDER, FOR SOLUTION INTRAMUSCULAR; INTRAVENOUS at 02:17

## 2020-08-02 RX ADMIN — SODIUM CHLORIDE 1000 ML: 9 INJECTION, SOLUTION INTRAVENOUS at 00:57

## 2020-08-02 NOTE — ED NOTES
Patient placed on RA , O2 sat maintained at 97% on RA     Jen Simpson, [facility]  08/02/20 5791
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
## 2020-09-25 ENCOUNTER — APPOINTMENT (OUTPATIENT)
Dept: CT IMAGING | Age: 35
DRG: 383 | End: 2020-09-25
Payer: MEDICAID

## 2020-09-25 ENCOUNTER — HOSPITAL ENCOUNTER (INPATIENT)
Age: 35
LOS: 1 days | Discharge: HOME | DRG: 383 | End: 2020-09-27
Attending: EMERGENCY MEDICINE | Admitting: FAMILY MEDICINE
Payer: MEDICAID

## 2020-09-25 LAB
ALBUMIN SERPL-MCNC: 3.7 G/DL (ref 3.5–5.2)
ALP BLD-CCNC: 64 U/L (ref 35–104)
ALT SERPL-CCNC: 5 U/L (ref 0–32)
ANION GAP SERPL CALCULATED.3IONS-SCNC: 8 MMOL/L (ref 7–16)
AST SERPL-CCNC: 12 U/L (ref 0–31)
BACTERIA: NORMAL /HPF
BASOPHILS ABSOLUTE: 0.05 E9/L (ref 0–0.2)
BASOPHILS RELATIVE PERCENT: 0.3 % (ref 0–2)
BILIRUB SERPL-MCNC: 0.4 MG/DL (ref 0–1.2)
BILIRUBIN URINE: NEGATIVE
BLOOD, URINE: ABNORMAL
BUN BLDV-MCNC: 17 MG/DL (ref 6–20)
CALCIUM SERPL-MCNC: 9 MG/DL (ref 8.6–10.2)
CHLORIDE BLD-SCNC: 98 MMOL/L (ref 98–107)
CLARITY: CLEAR
CO2: 29 MMOL/L (ref 22–29)
COLOR: YELLOW
CREAT SERPL-MCNC: 0.7 MG/DL (ref 0.5–1)
EOSINOPHILS ABSOLUTE: 0.18 E9/L (ref 0.05–0.5)
EOSINOPHILS RELATIVE PERCENT: 1.2 % (ref 0–6)
GFR AFRICAN AMERICAN: >60
GFR NON-AFRICAN AMERICAN: >60 ML/MIN/1.73
GLUCOSE BLD-MCNC: 101 MG/DL (ref 74–99)
GLUCOSE URINE: NEGATIVE MG/DL
HCG(URINE) PREGNANCY TEST: NEGATIVE
HCT VFR BLD CALC: 37.1 % (ref 34–48)
HEMOGLOBIN: 12.8 G/DL (ref 11.5–15.5)
IMMATURE GRANULOCYTES #: 0.07 E9/L
IMMATURE GRANULOCYTES %: 0.5 % (ref 0–5)
KETONES, URINE: NEGATIVE MG/DL
LACTIC ACID: 1.6 MMOL/L (ref 0.5–2.2)
LEUKOCYTE ESTERASE, URINE: ABNORMAL
LYMPHOCYTES ABSOLUTE: 2.87 E9/L (ref 1.5–4)
LYMPHOCYTES RELATIVE PERCENT: 19.3 % (ref 20–42)
MAGNESIUM: 1.8 MG/DL (ref 1.6–2.6)
MCH RBC QN AUTO: 31.3 PG (ref 26–35)
MCHC RBC AUTO-ENTMCNC: 34.5 % (ref 32–34.5)
MCV RBC AUTO: 90.7 FL (ref 80–99.9)
MONOCYTES ABSOLUTE: 0.97 E9/L (ref 0.1–0.95)
MONOCYTES RELATIVE PERCENT: 6.5 % (ref 2–12)
NEUTROPHILS ABSOLUTE: 10.75 E9/L (ref 1.8–7.3)
NEUTROPHILS RELATIVE PERCENT: 72.2 % (ref 43–80)
NITRITE, URINE: NEGATIVE
PDW BLD-RTO: 13.2 FL (ref 11.5–15)
PH UA: 6 (ref 5–9)
PLATELET # BLD: 192 E9/L (ref 130–450)
PMV BLD AUTO: 10.3 FL (ref 7–12)
POTASSIUM REFLEX MAGNESIUM: 3.5 MMOL/L (ref 3.5–5)
PROTEIN UA: NEGATIVE MG/DL
RBC # BLD: 4.09 E12/L (ref 3.5–5.5)
RBC UA: NORMAL /HPF (ref 0–2)
SODIUM BLD-SCNC: 135 MMOL/L (ref 132–146)
SPECIFIC GRAVITY UA: <=1.005 (ref 1–1.03)
TOTAL PROTEIN: 6.8 G/DL (ref 6.4–8.3)
UROBILINOGEN, URINE: 0.2 E.U./DL
WBC # BLD: 14.9 E9/L (ref 4.5–11.5)
WBC UA: NORMAL /HPF (ref 0–5)

## 2020-09-25 PROCEDURE — 83735 ASSAY OF MAGNESIUM: CPT

## 2020-09-25 PROCEDURE — 2580000003 HC RX 258: Performed by: NURSE PRACTITIONER

## 2020-09-25 PROCEDURE — 96375 TX/PRO/DX INJ NEW DRUG ADDON: CPT

## 2020-09-25 PROCEDURE — 6360000004 HC RX CONTRAST MEDICATION: Performed by: RADIOLOGY

## 2020-09-25 PROCEDURE — 87040 BLOOD CULTURE FOR BACTERIA: CPT

## 2020-09-25 PROCEDURE — 85025 COMPLETE CBC W/AUTO DIFF WBC: CPT

## 2020-09-25 PROCEDURE — 2580000003 HC RX 258: Performed by: RADIOLOGY

## 2020-09-25 PROCEDURE — 6360000002 HC RX W HCPCS: Performed by: NURSE PRACTITIONER

## 2020-09-25 PROCEDURE — 99283 EMERGENCY DEPT VISIT LOW MDM: CPT

## 2020-09-25 PROCEDURE — 81025 URINE PREGNANCY TEST: CPT

## 2020-09-25 PROCEDURE — 96365 THER/PROPH/DIAG IV INF INIT: CPT

## 2020-09-25 PROCEDURE — 99285 EMERGENCY DEPT VISIT HI MDM: CPT

## 2020-09-25 PROCEDURE — 6370000000 HC RX 637 (ALT 250 FOR IP): Performed by: NURSE PRACTITIONER

## 2020-09-25 PROCEDURE — 74177 CT ABD & PELVIS W/CONTRAST: CPT

## 2020-09-25 PROCEDURE — 81001 URINALYSIS AUTO W/SCOPE: CPT

## 2020-09-25 PROCEDURE — 80053 COMPREHEN METABOLIC PANEL: CPT

## 2020-09-25 PROCEDURE — 83605 ASSAY OF LACTIC ACID: CPT

## 2020-09-25 PROCEDURE — 36415 COLL VENOUS BLD VENIPUNCTURE: CPT

## 2020-09-25 PROCEDURE — 99284 EMERGENCY DEPT VISIT MOD MDM: CPT

## 2020-09-25 RX ORDER — SODIUM CHLORIDE 0.9 % (FLUSH) 0.9 %
10 SYRINGE (ML) INJECTION PRN
Status: COMPLETED | OUTPATIENT
Start: 2020-09-25 | End: 2020-09-25

## 2020-09-25 RX ORDER — KETOROLAC TROMETHAMINE 30 MG/ML
15 INJECTION, SOLUTION INTRAMUSCULAR; INTRAVENOUS ONCE
Status: COMPLETED | OUTPATIENT
Start: 2020-09-25 | End: 2020-09-25

## 2020-09-25 RX ORDER — PIPERACILLIN SODIUM, TAZOBACTAM SODIUM 4; .5 G/20ML; G/20ML
INJECTION, POWDER, LYOPHILIZED, FOR SOLUTION INTRAVENOUS
Status: DISPENSED
Start: 2020-09-25 | End: 2020-09-26

## 2020-09-25 RX ORDER — CEFAZOLIN SODIUM 2 G/50ML
SOLUTION INTRAVENOUS
Status: DISPENSED
Start: 2020-09-25 | End: 2020-09-26

## 2020-09-25 RX ORDER — ACETAMINOPHEN 325 MG/1
650 TABLET ORAL ONCE
Status: COMPLETED | OUTPATIENT
Start: 2020-09-25 | End: 2020-09-25

## 2020-09-25 RX ORDER — FENTANYL CITRATE 50 UG/ML
25 INJECTION, SOLUTION INTRAMUSCULAR; INTRAVENOUS ONCE
Status: COMPLETED | OUTPATIENT
Start: 2020-09-25 | End: 2020-09-25

## 2020-09-25 RX ORDER — 0.9 % SODIUM CHLORIDE 0.9 %
1000 INTRAVENOUS SOLUTION INTRAVENOUS ONCE
Status: COMPLETED | OUTPATIENT
Start: 2020-09-25 | End: 2020-09-25

## 2020-09-25 RX ORDER — ONDANSETRON 2 MG/ML
4 INJECTION INTRAMUSCULAR; INTRAVENOUS ONCE
Status: COMPLETED | OUTPATIENT
Start: 2020-09-25 | End: 2020-09-25

## 2020-09-25 RX ADMIN — SODIUM CHLORIDE 1000 ML: 9 INJECTION, SOLUTION INTRAVENOUS at 20:50

## 2020-09-25 RX ADMIN — FENTANYL CITRATE 25 MCG: 50 INJECTION, SOLUTION INTRAMUSCULAR; INTRAVENOUS at 20:50

## 2020-09-25 RX ADMIN — ONDANSETRON 4 MG: 2 INJECTION INTRAMUSCULAR; INTRAVENOUS at 20:50

## 2020-09-25 RX ADMIN — Medication 10 ML: at 22:11

## 2020-09-25 RX ADMIN — IOPAMIDOL 100 ML: 755 INJECTION, SOLUTION INTRAVENOUS at 22:11

## 2020-09-25 RX ADMIN — ACETAMINOPHEN 650 MG: 325 TABLET, FILM COATED ORAL at 22:26

## 2020-09-25 RX ADMIN — KETOROLAC TROMETHAMINE 15 MG: 30 INJECTION, SOLUTION INTRAMUSCULAR; INTRAVENOUS at 22:27

## 2020-09-25 RX ADMIN — PIPERACILLIN AND TAZOBACTAM 4.5 G: 4; .5 INJECTION, POWDER, LYOPHILIZED, FOR SOLUTION INTRAVENOUS; PARENTERAL at 23:22

## 2020-09-25 ASSESSMENT — PAIN SCALES - GENERAL
PAINLEVEL_OUTOF10: 10
PAINLEVEL_OUTOF10: 7
PAINLEVEL_OUTOF10: 10

## 2020-09-25 ASSESSMENT — PAIN DESCRIPTION - PAIN TYPE
TYPE: ACUTE PAIN
TYPE: ACUTE PAIN

## 2020-09-25 ASSESSMENT — PAIN DESCRIPTION - DESCRIPTORS: DESCRIPTORS: SHARP;ACHING

## 2020-09-25 ASSESSMENT — PAIN DESCRIPTION - LOCATION
LOCATION: BUTTOCKS
LOCATION: BUTTOCKS

## 2020-09-26 PROBLEM — L03.90 CELLULITIS: Status: ACTIVE | Noted: 2020-09-26

## 2020-09-26 LAB
ANION GAP SERPL CALCULATED.3IONS-SCNC: 10 MMOL/L (ref 7–16)
ANTISTREPTOLYSIN-O: 534 IU/ML (ref 0–200)
BUN BLDV-MCNC: 13 MG/DL (ref 6–20)
C-REACTIVE PROTEIN: 6.1 MG/DL (ref 0–0.4)
CALCIUM SERPL-MCNC: 8.2 MG/DL (ref 8.6–10.2)
CHLORIDE BLD-SCNC: 104 MMOL/L (ref 98–107)
CO2: 25 MMOL/L (ref 22–29)
CREAT SERPL-MCNC: 0.7 MG/DL (ref 0.5–1)
GFR AFRICAN AMERICAN: >60
GFR NON-AFRICAN AMERICAN: >60 ML/MIN/1.73
GLUCOSE BLD-MCNC: 109 MG/DL (ref 74–99)
HCT VFR BLD CALC: 35.7 % (ref 34–48)
HEMOGLOBIN: 11.9 G/DL (ref 11.5–15.5)
MCH RBC QN AUTO: 29.9 PG (ref 26–35)
MCHC RBC AUTO-ENTMCNC: 33.3 % (ref 32–34.5)
MCV RBC AUTO: 89.7 FL (ref 80–99.9)
PDW BLD-RTO: 13.2 FL (ref 11.5–15)
PLATELET # BLD: 181 E9/L (ref 130–450)
PMV BLD AUTO: 10.6 FL (ref 7–12)
POTASSIUM SERPL-SCNC: 3.2 MMOL/L (ref 3.5–5)
RBC # BLD: 3.98 E12/L (ref 3.5–5.5)
SEDIMENTATION RATE, ERYTHROCYTE: 14 MM/HR (ref 0–20)
SODIUM BLD-SCNC: 139 MMOL/L (ref 132–146)
WBC # BLD: 12.5 E9/L (ref 4.5–11.5)

## 2020-09-26 PROCEDURE — 96367 TX/PROPH/DG ADDL SEQ IV INF: CPT

## 2020-09-26 PROCEDURE — 85027 COMPLETE CBC AUTOMATED: CPT

## 2020-09-26 PROCEDURE — 96366 THER/PROPH/DIAG IV INF ADDON: CPT

## 2020-09-26 PROCEDURE — 6370000000 HC RX 637 (ALT 250 FOR IP): Performed by: FAMILY MEDICINE

## 2020-09-26 PROCEDURE — 6360000002 HC RX W HCPCS: Performed by: FAMILY MEDICINE

## 2020-09-26 PROCEDURE — 80048 BASIC METABOLIC PNL TOTAL CA: CPT

## 2020-09-26 PROCEDURE — 36415 COLL VENOUS BLD VENIPUNCTURE: CPT

## 2020-09-26 PROCEDURE — 87186 SC STD MICRODIL/AGAR DIL: CPT

## 2020-09-26 PROCEDURE — 86140 C-REACTIVE PROTEIN: CPT

## 2020-09-26 PROCEDURE — 6370000000 HC RX 637 (ALT 250 FOR IP): Performed by: SPECIALIST

## 2020-09-26 PROCEDURE — 2580000003 HC RX 258: Performed by: FAMILY MEDICINE

## 2020-09-26 PROCEDURE — 6360000002 HC RX W HCPCS: Performed by: SURGERY

## 2020-09-26 PROCEDURE — 87075 CULTR BACTERIA EXCEPT BLOOD: CPT

## 2020-09-26 PROCEDURE — 85651 RBC SED RATE NONAUTOMATED: CPT

## 2020-09-26 PROCEDURE — 2580000003 HC RX 258: Performed by: SURGERY

## 2020-09-26 PROCEDURE — 87070 CULTURE OTHR SPECIMN AEROBIC: CPT

## 2020-09-26 PROCEDURE — 99221 1ST HOSP IP/OBS SF/LOW 40: CPT | Performed by: SURGERY

## 2020-09-26 PROCEDURE — G0378 HOSPITAL OBSERVATION PER HR: HCPCS

## 2020-09-26 PROCEDURE — 96372 THER/PROPH/DIAG INJ SC/IM: CPT

## 2020-09-26 PROCEDURE — 86060 ANTISTREPTOLYSIN O TITER: CPT

## 2020-09-26 PROCEDURE — 0H98XZZ DRAINAGE OF BUTTOCK SKIN, EXTERNAL APPROACH: ICD-10-PCS | Performed by: STUDENT IN AN ORGANIZED HEALTH CARE EDUCATION/TRAINING PROGRAM

## 2020-09-26 PROCEDURE — 1200000000 HC SEMI PRIVATE

## 2020-09-26 RX ORDER — ALPRAZOLAM 1 MG/1
2 TABLET ORAL 2 TIMES DAILY
Status: DISCONTINUED | OUTPATIENT
Start: 2020-09-26 | End: 2020-09-27 | Stop reason: HOSPADM

## 2020-09-26 RX ORDER — OXYCODONE HYDROCHLORIDE AND ACETAMINOPHEN 5; 325 MG/1; MG/1
1 TABLET ORAL EVERY 4 HOURS PRN
Status: DISCONTINUED | OUTPATIENT
Start: 2020-09-26 | End: 2020-09-27 | Stop reason: HOSPADM

## 2020-09-26 RX ORDER — HYDROCODONE BITARTRATE AND ACETAMINOPHEN 5; 325 MG/1; MG/1
1 TABLET ORAL EVERY 4 HOURS PRN
Status: DISCONTINUED | OUTPATIENT
Start: 2020-09-26 | End: 2020-09-26

## 2020-09-26 RX ORDER — LIDOCAINE HYDROCHLORIDE AND EPINEPHRINE 10; 10 MG/ML; UG/ML
INJECTION, SOLUTION INFILTRATION; PERINEURAL
Status: DISPENSED
Start: 2020-09-26 | End: 2020-09-26

## 2020-09-26 RX ORDER — LINEZOLID 600 MG/1
600 TABLET, FILM COATED ORAL EVERY 12 HOURS SCHEDULED
Status: DISCONTINUED | OUTPATIENT
Start: 2020-09-26 | End: 2020-09-27 | Stop reason: HOSPADM

## 2020-09-26 RX ORDER — POTASSIUM CHLORIDE 20 MEQ/1
40 TABLET, EXTENDED RELEASE ORAL ONCE
Status: COMPLETED | OUTPATIENT
Start: 2020-09-26 | End: 2020-09-26

## 2020-09-26 RX ORDER — SODIUM CHLORIDE 9 MG/ML
INJECTION, SOLUTION INTRAVENOUS EVERY 8 HOURS
Status: DISCONTINUED | OUTPATIENT
Start: 2020-09-26 | End: 2020-09-27

## 2020-09-26 RX ADMIN — LINEZOLID 600 MG: 600 TABLET, FILM COATED ORAL at 22:02

## 2020-09-26 RX ADMIN — LURASIDONE HYDROCHLORIDE 80 MG: 80 TABLET, FILM COATED ORAL at 09:55

## 2020-09-26 RX ADMIN — POTASSIUM CHLORIDE 40 MEQ: 1500 TABLET, EXTENDED RELEASE ORAL at 09:56

## 2020-09-26 RX ADMIN — ENOXAPARIN SODIUM 40 MG: 40 INJECTION SUBCUTANEOUS at 09:55

## 2020-09-26 RX ADMIN — HYDROCODONE BITARTRATE AND ACETAMINOPHEN 1 TABLET: 5; 325 TABLET ORAL at 09:55

## 2020-09-26 RX ADMIN — OXYCODONE AND ACETAMINOPHEN 1 TABLET: 5; 325 TABLET ORAL at 13:08

## 2020-09-26 RX ADMIN — HYDROCODONE BITARTRATE AND ACETAMINOPHEN 1 TABLET: 5; 325 TABLET ORAL at 05:36

## 2020-09-26 RX ADMIN — OXYCODONE AND ACETAMINOPHEN 1 TABLET: 5; 325 TABLET ORAL at 22:01

## 2020-09-26 RX ADMIN — PIPERACILLIN AND TAZOBACTAM 3.38 G: 3; .375 INJECTION, POWDER, LYOPHILIZED, FOR SOLUTION INTRAVENOUS at 22:02

## 2020-09-26 RX ADMIN — VANCOMYCIN HYDROCHLORIDE 1000 MG: 1 INJECTION, POWDER, LYOPHILIZED, FOR SOLUTION INTRAVENOUS at 05:36

## 2020-09-26 RX ADMIN — ALPRAZOLAM 2 MG: 1 TABLET ORAL at 09:55

## 2020-09-26 RX ADMIN — PIPERACILLIN AND TAZOBACTAM 3.38 G: 3; .375 INJECTION, POWDER, LYOPHILIZED, FOR SOLUTION INTRAVENOUS at 11:43

## 2020-09-26 RX ADMIN — OXYCODONE AND ACETAMINOPHEN 1 TABLET: 5; 325 TABLET ORAL at 16:58

## 2020-09-26 RX ADMIN — ALPRAZOLAM 2 MG: 1 TABLET ORAL at 22:01

## 2020-09-26 ASSESSMENT — PAIN SCALES - GENERAL
PAINLEVEL_OUTOF10: 10
PAINLEVEL_OUTOF10: 3
PAINLEVEL_OUTOF10: 10
PAINLEVEL_OUTOF10: 7
PAINLEVEL_OUTOF10: 9
PAINLEVEL_OUTOF10: 8
PAINLEVEL_OUTOF10: 5
PAINLEVEL_OUTOF10: 7

## 2020-09-26 ASSESSMENT — PAIN DESCRIPTION - PROGRESSION
CLINICAL_PROGRESSION: NOT CHANGED
CLINICAL_PROGRESSION: NOT CHANGED

## 2020-09-26 ASSESSMENT — PAIN DESCRIPTION - FREQUENCY
FREQUENCY: INTERMITTENT
FREQUENCY: INTERMITTENT

## 2020-09-26 ASSESSMENT — PAIN DESCRIPTION - DESCRIPTORS
DESCRIPTORS: ACHING;BURNING;DISCOMFORT
DESCRIPTORS: ACHING;SHARP
DESCRIPTORS: ACHING;BURNING

## 2020-09-26 ASSESSMENT — PAIN DESCRIPTION - ORIENTATION
ORIENTATION: LEFT
ORIENTATION: LEFT
ORIENTATION: RIGHT;LEFT

## 2020-09-26 ASSESSMENT — PAIN DESCRIPTION - ONSET
ONSET: ON-GOING
ONSET: ON-GOING

## 2020-09-26 ASSESSMENT — PAIN - FUNCTIONAL ASSESSMENT
PAIN_FUNCTIONAL_ASSESSMENT: ACTIVITIES ARE NOT PREVENTED
PAIN_FUNCTIONAL_ASSESSMENT: ACTIVITIES ARE NOT PREVENTED

## 2020-09-26 ASSESSMENT — PAIN DESCRIPTION - LOCATION
LOCATION: BUTTOCKS

## 2020-09-26 ASSESSMENT — ENCOUNTER SYMPTOMS
ABDOMINAL PAIN: 0
SHORTNESS OF BREATH: 0

## 2020-09-26 ASSESSMENT — PAIN DESCRIPTION - PAIN TYPE
TYPE: ACUTE PAIN

## 2020-09-26 NOTE — ED PROVIDER NOTES
HPI:  9/26/20, Time: 3:55 AM EDT         Jazmín Bishop is a 38 y.o. female presenting to the ED for wound to the buttocks as well as groin region, beginning days ago. The complaint has been persistent, moderate in severity, and worsened by nothing. Patient reporting bilateral redness to buttocks as well as groin region. Patient states that started 4 days ago. Patient reporting having fevers. Patient reporting no nausea or vomiting she reports no chest pain. Patient was seen at outlying facility had a CT of her abdomen pelvis which showed cellulitic area biotics as well as bernie-valvular region. Patient was sent here for general surgery to evaluate. ROS:   Pertinent positives and negatives are stated within HPI, all other systems reviewed and are negative.  --------------------------------------------- PAST HISTORY ---------------------------------------------  Past Medical History:  has a past medical history of Bipolar 1 disorder ([address] Chronic neck pain, Migraines, and Scoliosis. Past Surgical History:  has a past surgical history that includes hernia repair. Social History:  reports that she has been smoking cigarettes. She has a 7.50 pack-year smoking history. She has never used smokeless tobacco. She reports current alcohol use. She reports that she does not use drugs. Family History: family history includes Cancer in her paternal grandfather and paternal grandmother; Heart Disease in her maternal grandmother. The patients home medications have been reviewed. Allergies: Patient has no known allergies.     ---------------------------------------------------PHYSICAL EXAM--------------------------------------    Constitutional/General: Alert and oriented x3,   Head: Normocephalic and atraumatic  Eyes: PERRL, EOMI  Mouth: Oropharynx clear, handling secretions, no trismus  Neck: Supple, full ROM, non tender to palpation in the midline, no stridor, no crepitus, no meningeal signs  Pulmonary: todays results, in addition to providing specific details for the plan of care and counseling regarding the diagnosis and prognosis. Questions are answered at this time and they are agreeable with the plan.       --------------------------------- IMPRESSION AND DISPOSITION ---------------------------------    IMPRESSION  1. Cellulitis of buttock    2. Left buttock abscess        DISPOSITION  Disposition: Admit to med/surg floor  Patient condition is stable        NOTE: This report was transcribed using voice recognition software.  Every effort was made to ensure accuracy; however, inadvertent computerized transcription errors may be present          Rosalva Gonzalez MD  09/26/20 [address] [facility] MD [place]  09/26/20 [address], [place]  09/26/20 [id number]

## 2020-09-26 NOTE — PROCEDURES
Incision and Drainage Procedure Note    Indication: Abscess    Procedure: The patient was positioned appropriately and the skin over the incision site was prepped with betadine and draped in a sterile fashion. Local anesthesia was obtained by infiltration using 1% Lidocaine with epinephrine. An incision was then made over the apex of the lesion and approximately 5 cc of purulent material was expressed. Loculations were broken up using forceps and more of the material was able to be expressed. The drainage cavity was then irrigated. The patients tetanus status was up to date and did not require a booster dose. The patient tolerated the procedure well.     Complications: None    Electronically signed by Kalee Knight MD on 9/26/2020 at 4:55 AM

## 2020-09-26 NOTE — PROGRESS NOTES
Right buttock abscess status post incision and drainage at bedside on 926  Right buttock is softer, cellulitis is improved.   Packing is in place  Continue IV antibiotics  Await wound culture    Amos Shepherd MD, FACS  9/26/2020  10:52 AM

## 2020-09-26 NOTE — PLAN OF CARE
Problem: Infection:  Goal: Will remain free from infection  Description: Will remain free from infection  Outcome: Met This Shift     Problem: Safety:  Goal: Free from accidental physical injury  Description: Free from accidental physical injury  9/26/2020 1058 by Ellis Steinberg RN  Outcome: Met This Shift  9/26/2020 0504 by Parley Jeans, LPN  Outcome: Met This Shift  Goal: Free from intentional harm  Description: Free from intentional harm  9/26/2020 1058 by Ellis Steinberg RN  Outcome: Met This Shift  9/26/2020 0504 by Parley Jeans, LPN  Outcome: Met This Shift     Problem: Daily Care:  Goal: Daily care needs are met  Description: Daily care needs are met  Outcome: Met This Shift     Problem: Pain:  Goal: Patient's pain/discomfort is manageable  Description: Patient's pain/discomfort is manageable  Outcome: Met This Shift  Goal: Pain level will decrease  Description: Pain level will decrease  Outcome: Met This Shift  Goal: Control of acute pain  Description: Control of acute pain  Outcome: Met This Shift  Goal: Control of chronic pain  Description: Control of chronic pain  Outcome: Met This Shift     Problem: Skin Integrity:  Goal: Skin integrity will stabilize  Description: Skin integrity will stabilize  Outcome: Met This Shift     Problem: Discharge Planning:  Goal: Patients continuum of care needs are met  Description: Patients continuum of care needs are met  Outcome: Met This Shift     Problem: Falls - Risk of:  Goal: Will remain free from falls  Description: Will remain free from falls  Outcome: Met This Shift  Goal: Absence of physical injury  Description: Absence of physical injury  Outcome: Met This Shift

## 2020-09-26 NOTE — H&P
Eosinophils % 1.2 0.0 - 6.0 %    Basophils % 0.3 0.0 - 2.0 %    Neutrophils Absolute 10.75 (H) 1.80 - 7.30 E9/L    Immature Granulocytes # 0.07 E9/L    Lymphocytes Absolute 2.87 1.50 - 4.00 E9/L    Monocytes Absolute 0.97 (H) 0.10 - 0.95 E9/L    Eosinophils Absolute 0.18 0.05 - 0.50 E9/L    Basophils Absolute 0.05 0.00 - 0.20 E9/L   Comprehensive Metabolic Panel w/ Reflex to MG    Collection Time: 09/25/20  8:23 PM   Result Value Ref Range    Sodium 135 132 - 146 mmol/L    Potassium reflex Magnesium 3.5 3.5 - 5.0 mmol/L    Chloride 98 98 - 107 mmol/L    CO2 29 22 - 29 mmol/L    Anion Gap 8 7 - 16 mmol/L    Glucose 101 (H) 74 - 99 mg/dL    BUN 17 6 - 20 mg/dL    CREATININE 0.7 0.5 - 1.0 mg/dL    GFR Non-African American >60 >=60 mL/min/1.73    GFR African American >60     Calcium 9.0 8.6 - 10.2 mg/dL    Total Protein 6.8 6.4 - 8.3 g/dL    Alb 3.7 3.5 - 5.2 g/dL    Total Bilirubin 0.4 0.0 - 1.2 mg/dL    Alkaline Phosphatase 64 35 - 104 U/L    ALT 5 0 - 32 U/L    AST 12 0 - 31 U/L   Lactic Acid, Plasma    Collection Time: 09/25/20  8:23 PM   Result Value Ref Range    Lactic Acid 1.6 0.5 - 2.2 mmol/L   Magnesium    Collection Time: 09/25/20  8:23 PM   Result Value Ref Range    Magnesium 1.8 1.6 - 2.6 mg/dL   Pregnancy, Urine    Collection Time: 09/25/20  9:57 PM   Result Value Ref Range    HCG(Urine) Pregnancy Test NEGATIVE NEGATIVE   Urinalysis    Collection Time: 09/25/20  9:57 PM   Result Value Ref Range    Color, UA Yellow Straw/Yellow    Clarity, UA Clear Clear    Glucose, Ur Negative Negative mg/dL    Bilirubin Urine Negative Negative    Ketones, Urine Negative Negative mg/dL    Specific Gravity, UA <=1.005 1.005 - 1.030    Blood, Urine TRACE-INTACT Negative    pH, UA 6.0 5.0 - 9.0    Protein, UA Negative Negative mg/dL    Urobilinogen, Urine 0.2 <2.0 E.U./dL    Nitrite, Urine Negative Negative    Leukocyte Esterase, Urine SMALL (A) Negative   Microscopic Urinalysis    Collection Time: 09/25/20  9:57 PM   Result Value Ref Range    WBC, UA 2-5 0 - 5 /HPF    RBC, UA NONE 0 - 2 /HPF    Bacteria, UA NONE SEEN None Seen /HPF   CBC    Collection Time: 20  5:03 AM   Result Value Ref Range    WBC 12.5 (H) 4.5 - 11.5 E9/L    RBC 3.98 3.50 - 5.50 E12/L    Hemoglobin 11.9 11.5 - 15.5 g/dL    Hematocrit 35.7 34.0 - 48.0 %    MCV 89.7 80.0 - 99.9 fL    MCH 29.9 26.0 - 35.0 pg    MCHC 33.3 32.0 - 34.5 %    RDW 13.2 11.5 - 15.0 fL    Platelets 308 237 - 830 E9/L    MPV 10.6 7.0 - 12.0 fL   Basic metabolic panel    Collection Time: 20  5:03 AM   Result Value Ref Range    Sodium 139 132 - 146 mmol/L    Potassium 3.2 (L) 3.5 - 5.0 mmol/L    Chloride 104 98 - 107 mmol/L    CO2 25 22 - 29 mmol/L    Anion Gap 10 7 - 16 mmol/L    Glucose 109 (H) 74 - 99 mg/dL    BUN 13 6 - 20 mg/dL    CREATININE 0.7 0.5 - 1.0 mg/dL    GFR Non-African American >60 >=60 mL/min/1.73    GFR African American >60     Calcium 8.2 (L) 8.6 - 10.2 mg/dL        Imaging/Diagnostics Last 24 Hours   Ct Abdomen Pelvis W Iv Contrast Additional Contrast? None    Result Date: 2020  Patient MRN:  [id number] : 1985 Age: 38 years Gender: Female Order Date:  2020 10:10 PM TECHNIQUE/NUMBER OF IMAGES/COMPARISON/CLINICAL HISTORY: CT scanning abdomen and pelvis Axial images were obtained with sagittal and coronal reconstructions 402 images IV contrast 700 mL of [id number] History abscesses in the bilateral areas. There is 68-year-old female patient. FINDINGS: There are soft tissue edema and thickening of the dermis represents a dermatocellulitis in the more posterior inferior inner aspect of the right and left but tiny areas more prominent on the right. The it extends into the perivulvar region more on the right than on the left-sided. There is no extension into the ischiorectal fossa bilateral or to the immediate the perianal area. No abscess formation seen. There is no air in the soft tissues. There are normal size and enhancement for the liver.  Several

## 2020-09-26 NOTE — CONSULTS
[address] [place] [address] [facility]  ALICIAIDA  Consultation Note     Admit Date: 9/25/2020  7:24 PM    Reason for Consult:   Dermocellulitis ro right buttocks extending into. Vulvar    Attending Physician:  Hailee Lindsey MD    HISTORY OF PRESENT ILLNESS:             The history is obtained from extensive review of available past medical records. The patient is a 38 y.o. female who was staying in a local hotel to take some time away from her teenage children. She presents with a painful, red, raised lesion over her right buttock for the last 3 to 4 days. She has some subjective fevers and chills. Her aunt reportedly attempted to drain an abscess. WBC was 14.9. She had a temperature of 101.0 °F.  She was treated with Zosyn and Cefazolin. Seen by surgery. She underwent bedside debridement. Past Medical History:        Diagnosis Date    Bipolar 1 disorder ([facility] [address]     Chronic neck pain     Migraines     Scoliosis      Past Surgical History:        Procedure Laterality Date    HERNIA REPAIR      ventral hernia repair 3 years ago     Current Medications:   Scheduled Meds:   ALPRAZolam  2 mg Oral BID    lurasidone  80 mg Oral Daily    enoxaparin  40 mg Subcutaneous Daily    piperacillin-tazobactam  3.375 g Intravenous Q8H    linezolid  600 mg Oral 2 times per day     Continuous Infusions:   sodium chloride       PRN Meds:oxyCODONE-acetaminophen    Allergies:  Patient has no known allergies.     Social History:   Social History     Socioeconomic History    Marital status: Single     Spouse name: None    Number of children: None    Years of education: None    Highest education level: None   Occupational History    None   Social Needs    Financial resource strain: None    Food insecurity     Worry: None     Inability: None    Transportation needs     Medical: None     Non-medical: None   Tobacco Use    Smoking status: Current Every Day Smoker     Packs/day: 0.50     Years: 15.00 Pack years: 7.50     Types: Cigarettes    Smokeless tobacco: Never Used   Substance and Sexual Activity    Alcohol use: Yes    Drug use: No    Sexual activity: Not Currently   Lifestyle    Physical activity     Days per week: None     Minutes per session: None    Stress: None   Relationships    Social connections     Talks on phone: None     Gets together: None     Attends Synagogue service: None     Active member of club or organization: None     Attends meetings of clubs or organizations: None     Relationship status: None    Intimate partner violence     Fear of current or ex partner: None     Emotionally abused: None     Physically abused: None     Forced sexual activity: None   Other Topics Concern    None   Social History Narrative    None      Pets: No  Travel: No    Family History:       Problem Relation Age of Onset    Heart Disease Maternal Grandmother     Cancer Paternal Grandmother         leukemia    Cancer Paternal Grandfather         lung cancer   . Otherwise non-pertinent to the chief complaint. REVIEW OF SYSTEMS:    Constitutional: Positive for fevers, chills, diaphoresis  Neurologic: Negative   Psychiatric: Negative  Rheumatologic: Negative   Endocrine: Negative  Hematologic: Negative  Immunologic: Negative  ENT: Negative  Respiratory: Negative   Cardiovascular: Negative  GI: Negative  : Negative  Musculoskeletal: Negative  Skin: As in the HPI    PHYSICAL EXAM:    Vitals:   BP (!) 95/55   Pulse 81   Temp 99.1 °F (37.3 °C) (Temporal)   Resp 18   Ht 5' 2\" (1.575 m)   Wt 112 lb (50.8 kg)   SpO2 97%   BMI 20.49 kg/m²   Constitutional: The patient is awake, alert, and oriented. Visitor present. Skin: Warm and dry. No rashes were noted. HEENT: Eyes show round, and reactive pupils. No jaundice. Moist mucous membranes, no ulcerations, no thrush. Neck: Supple to movements. No lymphadenopathy. Chest: No use of accessory muscles to breathe. Symmetrical expansion.  Auscultation reveals no wheezing, crackles, or rhonchi. Cardiovascular: S1 and S2 are rhythmic and regular. No murmurs appreciated. Abdomen: Positive bowel sounds to auscultation. Benign to palpation. No masses felt. No hepatosplenomegaly. Extremities: No clubbing, no cyanosis, no edema. The right buttock shows an abscess that was recently debrided. Is located near the gluteal fold. There is an area of induration. It does not really extend towards the perineum. There is a satellite lesion with a tiny eschar and some surrounding erythema above it. There is another similar lesion on the contralateral gluteus. They are both tender to the touch.   Lines: peripheral      CBC+dif:  Recent Labs     09/25/20 2023 09/26/20  0503   WBC 14.9* 12.5*   HGB 12.8 11.9   HCT 37.1 35.7   MCV 90.7 89.7    181   NEUTROABS 10.75*  --      Lab Results   Component Value Date    CRP 6.1 (H) 09/26/2020      No results found for: CRPHS  Lab Results   Component Value Date    SEDRATE 14 09/26/2020     Lab Results   Component Value Date    ALT 5 09/25/2020    AST 12 09/25/2020    ALKPHOS 64 09/25/2020    BILITOT 0.4 09/25/2020     Lab Results   Component Value Date     09/26/2020    K 3.2 09/26/2020    K 3.5 09/25/2020     09/26/2020    CO2 25 09/26/2020    BUN 13 09/26/2020    CREATININE 0.7 09/26/2020    GFRAA >60 09/26/2020    LABGLOM >60 09/26/2020    GLUCOSE 109 09/26/2020    PROT 6.8 09/25/2020    LABALBU 3.7 09/25/2020    CALCIUM 8.2 09/26/2020    BILITOT 0.4 09/25/2020    ALKPHOS 64 09/25/2020    AST 12 09/25/2020    ALT 5 09/25/2020       No results found for: PROTIME, INR    No results found for: TSH    Lab Results   Component Value Date    COLORU Yellow 09/25/2020    PHUR 6.0 09/25/2020    WBCUA 2-5 09/25/2020    RBCUA NONE 09/25/2020    BACTERIA NONE SEEN 09/25/2020    CLARITYU Clear 09/25/2020    SPECGRAV <=1.005 09/25/2020    LEUKOCYTESUR SMALL 09/25/2020    UROBILINOGEN 0.2 09/25/2020    BILIRUBINUR Negative

## 2020-09-26 NOTE — CONSULTS
GENERAL SURGERY  CONSULT NOTE  9/26/2020    Physician Consulted: Dr. Moo Brennan  Reason for Consult: Buttock Abscess  Referring Physician: Dr. Dana MARIE  Gee De León is a 38 y.o. female with no significant past medical history presents with 4 days of increasing pain swelling and redness on her buttock. Abscess noted. Patient states she has had some drainage and reports fevers and chills. Pain is been increasing. Patient states that she got this by sleeping on a dirty comforter at a motel. Past Medical History:   Diagnosis Date    Bipolar 1 disorder ([facility] [address]     Chronic neck pain     Migraines     Scoliosis        Past Surgical History:   Procedure Laterality Date    HERNIA REPAIR      ventral hernia repair 3 years ago       Medications Prior to Admission:    Prior to Admission medications    Medication Sig Start Date End Date Taking? Authorizing Provider   lurasidone (LATUDA) 40 MG TABS tablet Take 80 mg by mouth daily   Yes Historical Provider, MD   ALPRAZolam (XANAX) 1 MG tablet Take 2 mg by mouth 2 times daily. .   Yes Historical Provider, MD       No Known Allergies    Family History   Problem Relation Age of Onset    Heart Disease Maternal Grandmother     Cancer Paternal Grandmother         leukemia    Cancer Paternal Grandfather         lung cancer       Social History     Tobacco Use    Smoking status: Current Every Day Smoker     Packs/day: 0.50     Years: 15.00     Pack years: 7.50     Types: Cigarettes    Smokeless tobacco: Never Used   Substance Use Topics    Alcohol use:  Yes    Drug use: No         Review of Systems   General ROS: negative  Hematological and Lymphatic ROS: negative  Respiratory ROS: no cough, shortness of breath, or wheezing  Cardiovascular ROS: no chest pain or dyspnea on exertion  Gastrointestinal ROS: no abdominal pain, change in bowel habits, or black or bloody stools  Genito-Urinary ROS: no dysuria, trouble voiding, or hematuria  Musculoskeletal ROS: Pain in buttocks      PHYSICAL EXAM:    Vitals:    20 2301   BP:    Pulse: 98   Resp:    Temp: 100.5 °F (38.1 °C)   SpO2:        General Appearance:  awake, alert, oriented, in no acute distress  Skin:  Skin color, texture, turgor normal. No rashes or lesions. Head/face:  NCAT  Eyes:  No gross abnormalities. Lungs:  Normal expansion. Clear to auscultation. No rales, rhonchi, or wheezing. Heart:  Heart sounds are normal.  Regular rate and rhythm without murmur, gallop or rub. Abdomen:  Soft, non-tender, normal bowel sounds. No bruits, organomegaly or masses. Extremities: Extremities warm to touch, pink, with no edema. Buttocks: Right-sided buttock abscess about 6 cm large with fluctuance and induration    LABS:    CBC  Recent Labs     20   WBC 14.9*   HGB 12.8   HCT 37.1        BMP  Recent Labs     20      K 3.5   CL 98   CO2 29   BUN 17   CREATININE 0.7   CALCIUM 9.0     Liver Function  Recent Labs     20   BILITOT 0.4   AST 12   ALT 5   ALKPHOS 64   PROT 6.8   LABALBU 3.7     No results for input(s): LACTATE in the last 72 hours. No results for input(s): INR, PTT in the last 72 hours. Invalid input(s): PT    RADIOLOGY    Ct Abdomen Pelvis W Iv Contrast Additional Contrast? None    Result Date: 2020  Patient MRN:  [id number] : 1985 Age: 38 years Gender: Female Order Date:  2020 10:10 PM TECHNIQUE/NUMBER OF IMAGES/COMPARISON/CLINICAL HISTORY: CT scanning abdomen and pelvis Axial images were obtained with sagittal and coronal reconstructions 402 images IV contrast 700 mL of Isovue-370 History abscesses in the bilateral areas. There is 78-year-old female patient. FINDINGS: There are soft tissue edema and thickening of the dermis represents a dermatocellulitis in the more posterior inferior inner aspect of the right and left but tiny areas more prominent on the right.  The it extends into the perivulvar region more on the right than on the left-sided. There is no extension into the ischiorectal fossa bilateral or to the immediate the perianal area. No abscess formation seen. There is no air in the soft tissues. There are normal size and enhancement for the liver. Several hypoenhancing lesions are seen in the liver the small size of few millimeters likely to represent small cysts but they are too small to be characterized. Ultrasound can be helpful for follow up. A similar findings are seen in the right and left kidneys they are likely the related small cyst but they cannot be fully characterized due to partial volume average. ULTRASOUND can be helpful for follow-up of them are. The spleen has a normal size . Gallbladder is contracted, food contents in the sternum. Degenerative pancreatic duct system is not dilated. Adrenals not enlarged. Aorta and IVC appear unremarkable. There is normal excretion of the contrast by the kidneys, there is no obstruction. Bladder has unremarkable appearance. There are unremarkable appearance for the uterus and ovaries. There is free fluid accumulation in the cul-de-sac. Appendix seen and appears unremarkable. There are no acute inflammatory changes omental mesenteric fat planes, free intraperitoneal air ascites or bowel obstruction. The Lower lung bases demonstrate no significant findings. Visualized bone structures are of unremarkable appearance. 1. Findings for dermatocellulitis in the more posterior inferior aspect of the right and left gluteal region with extension into the perivulvar regions more on the right. 2. No abscess formation seen. 3. No air in the soft tissues.          ASSESSMENT:  38 y.o. female with right buttock abscess    PLAN:    Incision and drainage at the bedside 9/26  Admit to medicine team  Wound culture sent  Daily dressing changes with iodoform gauze packing  Broad-spectrum antibiotics started by medicine team    Electronically signed by Frances Bustos MD on 9/26/20 at 4:49 AM EDT    [facility]   Attending Physician Statement:    I personally saw, examined and provided care for the patient. Radiographs, labs and medication list were reviewed by me independently. The case was discussed in detail and plans for care were established. Review of Residents documentation was conducted and revisions were made as appropriate. I agree with the above documented exam, problem list and plan of care. CC: rt gluteal abscess  Plan on bedside I&D for source control. Will send cx  Start on IV Zosyn  Will follow    Nestor Card MD, [facility]  9/26/2020  10:54 AM      NOTE: This report was transcribed using voice recognition software. Every effort was made to ensure accuracy; however, inadvertent computerized transcription errors may be present.

## 2020-09-27 VITALS
HEIGHT: 62 IN | WEIGHT: 112 LBS | BODY MASS INDEX: 20.61 KG/M2 | RESPIRATION RATE: 18 BRPM | DIASTOLIC BLOOD PRESSURE: 58 MMHG | TEMPERATURE: 98 F | HEART RATE: 99 BPM | SYSTOLIC BLOOD PRESSURE: 126 MMHG | OXYGEN SATURATION: 98 %

## 2020-09-27 LAB
ANION GAP SERPL CALCULATED.3IONS-SCNC: 14 MMOL/L (ref 7–16)
BUN BLDV-MCNC: 10 MG/DL (ref 6–20)
CALCIUM SERPL-MCNC: 8.5 MG/DL (ref 8.6–10.2)
CHLORIDE BLD-SCNC: 102 MMOL/L (ref 98–107)
CO2: 21 MMOL/L (ref 22–29)
CREAT SERPL-MCNC: 0.7 MG/DL (ref 0.5–1)
GFR AFRICAN AMERICAN: >60
GFR NON-AFRICAN AMERICAN: >60 ML/MIN/1.73
GLUCOSE BLD-MCNC: 137 MG/DL (ref 74–99)
HCT VFR BLD CALC: 37.8 % (ref 34–48)
HEMOGLOBIN: 12.5 G/DL (ref 11.5–15.5)
MCH RBC QN AUTO: 29.7 PG (ref 26–35)
MCHC RBC AUTO-ENTMCNC: 33.1 % (ref 32–34.5)
MCV RBC AUTO: 89.8 FL (ref 80–99.9)
PDW BLD-RTO: 12.9 FL (ref 11.5–15)
PLATELET # BLD: 175 E9/L (ref 130–450)
PMV BLD AUTO: 11 FL (ref 7–12)
POTASSIUM SERPL-SCNC: 4.1 MMOL/L (ref 3.5–5)
RBC # BLD: 4.21 E12/L (ref 3.5–5.5)
SODIUM BLD-SCNC: 137 MMOL/L (ref 132–146)
WBC # BLD: 9.9 E9/L (ref 4.5–11.5)

## 2020-09-27 PROCEDURE — 96372 THER/PROPH/DIAG INJ SC/IM: CPT

## 2020-09-27 PROCEDURE — 85027 COMPLETE CBC AUTOMATED: CPT

## 2020-09-27 PROCEDURE — 6370000000 HC RX 637 (ALT 250 FOR IP): Performed by: SPECIALIST

## 2020-09-27 PROCEDURE — 6360000002 HC RX W HCPCS: Performed by: SURGERY

## 2020-09-27 PROCEDURE — 2580000003 HC RX 258: Performed by: SURGERY

## 2020-09-27 PROCEDURE — 36415 COLL VENOUS BLD VENIPUNCTURE: CPT

## 2020-09-27 PROCEDURE — 6370000000 HC RX 637 (ALT 250 FOR IP): Performed by: FAMILY MEDICINE

## 2020-09-27 PROCEDURE — 80048 BASIC METABOLIC PNL TOTAL CA: CPT

## 2020-09-27 PROCEDURE — G0378 HOSPITAL OBSERVATION PER HR: HCPCS

## 2020-09-27 PROCEDURE — 6360000002 HC RX W HCPCS: Performed by: FAMILY MEDICINE

## 2020-09-27 PROCEDURE — 99024 POSTOP FOLLOW-UP VISIT: CPT | Performed by: SURGERY

## 2020-09-27 PROCEDURE — 96366 THER/PROPH/DIAG IV INF ADDON: CPT

## 2020-09-27 RX ORDER — LINEZOLID 600 MG/1
600 TABLET, FILM COATED ORAL EVERY 12 HOURS SCHEDULED
Qty: 24 TABLET | Refills: 0 | Status: SHIPPED | OUTPATIENT
Start: 2020-09-27 | End: 2020-09-27

## 2020-09-27 RX ORDER — OXYCODONE HYDROCHLORIDE AND ACETAMINOPHEN 5; 325 MG/1; MG/1
1 TABLET ORAL EVERY 6 HOURS PRN
Qty: 28 TABLET | Refills: 0 | Status: SHIPPED | OUTPATIENT
Start: 2020-09-27 | End: 2020-10-04

## 2020-09-27 RX ORDER — LINEZOLID 600 MG/1
600 TABLET, FILM COATED ORAL EVERY 12 HOURS SCHEDULED
Qty: 24 TABLET | Refills: 0 | Status: SHIPPED | OUTPATIENT
Start: 2020-09-27 | End: 2020-10-09

## 2020-09-27 RX ADMIN — SODIUM CHLORIDE: 9 INJECTION, SOLUTION INTRAVENOUS at 01:33

## 2020-09-27 RX ADMIN — OXYCODONE AND ACETAMINOPHEN 1 TABLET: 5; 325 TABLET ORAL at 04:06

## 2020-09-27 RX ADMIN — LURASIDONE HYDROCHLORIDE 80 MG: 80 TABLET, FILM COATED ORAL at 08:31

## 2020-09-27 RX ADMIN — SODIUM CHLORIDE: 9 INJECTION, SOLUTION INTRAVENOUS at 09:15

## 2020-09-27 RX ADMIN — OXYCODONE AND ACETAMINOPHEN 1 TABLET: 5; 325 TABLET ORAL at 08:29

## 2020-09-27 RX ADMIN — ENOXAPARIN SODIUM 40 MG: 40 INJECTION SUBCUTANEOUS at 08:29

## 2020-09-27 RX ADMIN — ALPRAZOLAM 2 MG: 1 TABLET ORAL at 08:28

## 2020-09-27 RX ADMIN — PIPERACILLIN AND TAZOBACTAM 3.38 G: 3; .375 INJECTION, POWDER, LYOPHILIZED, FOR SOLUTION INTRAVENOUS at 05:53

## 2020-09-27 RX ADMIN — OXYCODONE AND ACETAMINOPHEN 1 TABLET: 5; 325 TABLET ORAL at 17:13

## 2020-09-27 RX ADMIN — LINEZOLID 600 MG: 600 TABLET, FILM COATED ORAL at 08:28

## 2020-09-27 ASSESSMENT — PAIN SCALES - GENERAL
PAINLEVEL_OUTOF10: 8
PAINLEVEL_OUTOF10: 7
PAINLEVEL_OUTOF10: 5
PAINLEVEL_OUTOF10: 8
PAINLEVEL_OUTOF10: 0

## 2020-09-27 ASSESSMENT — ENCOUNTER SYMPTOMS
SHORTNESS OF BREATH: 0
ABDOMINAL PAIN: 0

## 2020-09-27 NOTE — DISCHARGE SUMMARY
Discharge Summary    Date: 9/27/2020  Patient Name: Howie Rodrigues YOB: 1985 Age: 38 y.o. Admit Date: 9/25/2020  Discharge Date: 9/27/2020  Discharge Condition:    Admission Diagnosis  Cellulitis (L03.90)     Discharge Diagnosis  Active Problems: CellulitisResolved Problems: * No resolved hospital problems. [facility] Stay  Narrative of Hospital Course:  Patient presented to the ER with gluteal abscess. Surgery did I&D. Discharged on zyvox per ID. Consultants:  IP CONSULT TO GENERAL SURGERYIP CONSULT TO INFECTIOUS DISEASES    Surgeries/procedures Performed:       Treatments:           Discharge Plan/Disposition:  Home    Hospital/Incidental Findings Requiring Follow Up:    Patient Instructions:    Diet: Regular Diet    Activity:Activity as Tolerated  For number of days (if applicable): Other Instructions:    Provider Follow-Up:   No follow-ups on file.      Significant Diagnostic Studies:    Recent Labs:  Admission on 09/25/2020WBC                                         Date: 09/25/2020Value: 14.9*       Ref range: 4.5 - 11.5 E9/L    Status: FinalRBC                                           Date: 09/25/2020Value: 4.09        Ref range: 3.50 - 5.50 E12/L  Status: FinalHemoglobin                                    Date: 09/25/2020Value: 12.8        Ref range: 11.5 - 15.5 g/dL   Status: FinalHematocrit                                    Date: 09/25/2020Value: 37.1        Ref range: 34.0 - 48.0 %      Status: FinalMCV                                           Date: 09/25/2020Value: 90.7        Ref range: 80.0 - 99.9 fL     Status: [facility]                                           Date: 09/25/2020Value: 31.3        Ref range: 26.0 - 35.0 pg     Status: [address]                                          Date: 09/25/2020Value: 34.5        Ref range: 32.0 - 34.5 %      Status: FinalRDW                                           Date: 09/25/2020Value: 13.2        Ref range: 11.5 - 15.0 fL Date: 09/25/2020Value: 12          Ref range: 0 - 31 U/L         Status: FinalLactic Acid                                   Date: 09/25/2020Value: 1.6         Ref range: 0.5 - 2.2 mmol/L   Status: FinalHCG(Urine) Pregnancy Test                     Date: 09/25/2020Value: NEGATIVE    Ref range: NEGATIVE           Status: Final              Comment: Test results should always be evaluated with all availableclinical data. If a urine sample is too dilute, it may not containa arepresentative urinary hCG concentration. If a negativeresult is obtainedand pregnancy is still suspected, afirst morning sample should be obtained and tested. Blood Culture, Routine                        Date: 09/25/2020Value: 24 Hours no growth                     Status: PreliminaryCulture, Blood 2                              Date: 09/25/2020Value: 24 Hours no growth                     Status: PreliminaryColor, UA                                     Date: 09/25/2020Value: Yellow      Ref range: Straw/Yellow       Status: FinalClarity, UA                                   Date: 09/25/2020Value: Clear       Ref range: Clear              Status: FinalGlucose, Ur                                   Date: 09/25/2020Value: Negative    Ref range: Negative mg/dL     Status: FinalBilirubin Urine                               Date: 09/25/2020Value: Negative    Ref range: Negative           Status: FinalKetones, Urine                                Date: 09/25/2020Value: Negative    Ref range: Negative mg/dL     Status: FinalSpecific Gravity, UA                          Date: 09/25/2020Value: <=1.005     Ref range: 1.005 - 1.030      Status: FinalBlood, Urine                                  Date: 09/25/2020Value: TRACE-INTACT                   Ref range: Negative           Status: FinalpH, UA                                        Date: 09/25/2020Value: 6.0         Ref range: 5.0 - 9.0          Status: FinalProtein, UA Date: 09/25/2020Value: Negative    Ref range: Negative mg/dL     Status: FinalUrobilinogen, Urine                           Date: 09/25/2020Value: 0.2         Ref range: <2.0 E.U./dL       Status: FinalNitrite, Urine                                Date: 09/25/2020Value: Negative    Ref range: Negative           Status: FinalLeukocyte Esterase, Urine                     Date: 09/25/2020Value: SMALL*      Ref range: Negative           Status: FinalMagnesium                                     Date: 09/25/2020Value: 1.8         Ref range: 1.6 - 2.6 mg/dL    Status: [address] [facility], UA                                       Date: 09/25/2020Value: 2-5         Ref range: 0 - 5 /HPF         Status: FinalRBC, UA                                       Date: 09/25/2020Value: NONE        Ref range: 0 - 2 /HPF         Status: FinalBacteria, UA                                  Date: 09/25/2020Value: NONE SEEN   Ref range: None Seen /HPF     Status: FinalOrganism                                      Date: 09/26/2020Value: Staphylococcus aureus                     Status: PreliminaryWOUND/ABSCESS                                 Date: 09/26/2020Value:               Status: Preliminary                 Value:Heavy growthSensitivity to [facility]                                           Date: 09/26/2020Value: 12.5*       Ref range: 4.5 - 11.5 E9/L    Status: FinalRBC                                           Date: 09/26/2020Value: 3.98        Ref range: 3.50 - 5.50 E12/L  Status: FinalHemoglobin                                    Date: 09/26/2020Value: 11.9        Ref range: 11.5 - 15.5 g/dL   Status: FinalHematocrit                                    Date: 09/26/2020Value: 35.7        Ref range: 34.0 - 48.0 %      Status: FinalMCV                                           Date: 09/26/2020Value: 89.7        Ref range: 80.0 - 99.9 fL     Status: [facility]                                           Date: 09/26/2020Value: 29.9        Ref range: 26.0 - 35.0 pg     Status: [address]                                          Date: 09/26/2020Value: 33.3        Ref range: 32.0 - 34.5 %      Status: FinalRDW                                           Date: 09/26/2020Value: 13.2        Ref range: 11.5 - 15.0 fL     Status: FinalPlatelets                                     Date: 09/26/2020Value: 181         Ref range: 130 - 450 E9/L     Status: FinalMPV                                           Date: 09/26/2020Value: 10.6        Ref range: 7.0 - 12.0 fL      Status: FinalSodium                                        Date: 09/26/2020Value: 139         Ref range: 132 - 146 mmol/L   Status: FinalPotassium                                     Date: 09/26/2020Value: 3.2*        Ref range: 3.5 - 5.0 mmol/L   Status: FinalChloride                                      Date: 09/26/2020Value: 104         Ref range: 98 - 107 mmol/L    Status: FinalCO2                                           Date: 09/26/2020Value: 25          Ref range: 22 - 29 mmol/L     Status: FinalAnion Gap                                     Date: 09/26/2020Value: 10          Ref range: 7 - 16 mmol/L      Status: FinalGlucose                                       Date: 09/26/2020Value: 109*        Ref range: 74 - 99 mg/dL      Status: FinalBUN                                           Date: 09/26/2020Value: 13          Ref range: 6 - 20 mg/dL       Status: FinalCREATININE                                    Date: 09/26/2020Value: 0.7         Ref range: 0.5 - 1.0 mg/dL    Status: FinalGFR Non-                      Date: 09/26/2020Value: >60         Ref range: >=60 mL/min/1.73   Status: Final              Comment: Chronic Kidney Disease: less than 60 ml/min/1.73 sq.m. Kidney Failure: less than 15 ml/min/1.73 sq. m. Results valid for patients 18 years and older. GFR                           Date: 09/26/2020Value: >60           Status: FinalCalcium Date: 09/26/2020Value: 8.2*        Ref range: 8.6 - 10.2 mg/dL   Status: FinalOrganism                                      Date: 09/26/2020Value: Staphylococcus aureus                     Status: PreliminaryWOUND/ABSCESS                                 Date: 09/26/2020Value:               Status: Preliminary                 Value:Heavy growthSensitivity to followRefer to previous culture for susceptibility resultsCXWND RT BUTT from 9-26-20 242amASO                                           Date: 09/26/2020Value: 534*        Ref range: 0 - 200 IU/mL      Status: Final              Comment: EXPECTED ASO VALUESNormal adults:  <200 IU/mLPediatrics:     <150 IU/mLCRP                                           Date: 09/26/2020Value: 6.1*        Ref range: 0.0 - 0.4 mg/dL    Status: FinalSed Rate                                      Date: 09/26/2020Value: 14          Ref range: 0 - 20 mm/Hr       Status: [address] [facility]                                           Date: 09/27/2020Value: 9.9         Ref range: 4.5 - 11.5 E9/L    Status: FinalRBC                                           Date: 09/27/2020Value: 4.21        Ref range: 3.50 - 5.50 E12/L  Status: FinalHemoglobin                                    Date: 09/27/2020Value: 12.5        Ref range: 11.5 - 15.5 g/dL   Status: FinalHematocrit                                    Date: 09/27/2020Value: 37.8        Ref range: 34.0 - 48.0 %      Status: FinalMCV                                           Date: 09/27/2020Value: 89.8        Ref range: 80.0 - 99.9 fL     Status: [address]                                           Date: 09/27/2020Value: 29.7        Ref range: 26.0 - 35.0 pg     Status: [address]                                          Date: 09/27/2020Value: 33.1        Ref range: 32.0 - 34.5 %      Status: FinalRDW                                           Date: 09/27/2020Value: 12.9        Ref range: 11.5 - 15.0 fL     Status: FinalPlatelets                                     Date: 09/27/2020Value: 175         Ref range: 130 - 450 E9/L     Status: FinalMPV                                           Date: 09/27/2020Value: 11.0        Ref range: 7.0 - 12.0 fL      Status: FinalSodium                                        Date: 09/27/2020Value: 137         Ref range: 132 - 146 mmol/L   Status: FinalPotassium                                     Date: 09/27/2020Value: 4.1         Ref range: 3.5 - 5.0 mmol/L   Status: FinalChloride                                      Date: 09/27/2020Value: 102         Ref range: 98 - 107 mmol/L    Status: FinalCO2                                           Date: 09/27/2020Value: 21*         Ref range: 22 - 29 mmol/L     Status: FinalAnion Gap                                     Date: 09/27/2020Value: 14          Ref range: 7 - 16 mmol/L      Status: FinalGlucose                                       Date: 09/27/2020Value: 137*        Ref range: 74 - 99 mg/dL      Status: FinalBUN                                           Date: 09/27/2020Value: 10          Ref range: 6 - 20 mg/dL       Status: FinalCREATININE                                    Date: 09/27/2020Value: 0.7         Ref range: 0.5 - 1.0 mg/dL    Status: FinalGFR Non-                      Date: 09/27/2020Value: >60         Ref range: >=60 mL/min/1.73   Status: Final              Comment: Chronic Kidney Disease: less than 60 ml/min/1.73 sq.m. Kidney Failure: less than 15 ml/min/1.73 sq. m. Results valid for patients 18 years and older. GFR                           Date: 09/27/2020Value: >60           Status: FinalCalcium                                       Date: 09/27/2020Value: 8.5*        Ref range: 8.6 - 10.2 mg/dL   Status: Final------------    Radiology last 7 days:  Ct Abdomen Pelvis W Iv Contrast Additional Contrast? NoneResult Date: 9/25/20201.  Findings for dermatocellulitis in the more posterior inferior aspect of the right and left gluteal region with extension into the perivulvar regions more on the right. 2. No abscess formation seen. 3. No air in the soft tissues. Pending Labs   Order Current Status  Culture, Anaerobic In process  Culture, Blood 1 Preliminary result  Culture, Blood 2 Preliminary result  Culture, Wound Preliminary result  Culture, Wound Preliminary result      Discharge Medications    Current Discharge Medication ListSTART taking these medicationslinezolid (ZYVOX) 600 MG tabletTake 1 tablet by mouth every 12 hours for 12 daysQty: 24 tablet Refills: 0oxyCODONE-acetaminophen (PERCOCET) 5-325 MG per tabletTake 1 tablet by mouth every 6 hours as needed for Pain for up to 7 days. Qty: 28 tablet Refills: 0Comments: Reduce doses taken as pain becomes manageableAssociated Diagnoses:Cellulitis of buttock    Current Discharge Medication List    Current Discharge Medication ListCONTINUE these medications which have NOT CHANGEDlurasidone (LATUDA) 40 MG TABS tabletTake 80 mg by mouth dailyALPRAZolam (XANAX) 1 MG tabletTake 2 mg by mouth 2 times daily. .    Current Discharge Medication ListSTOP taking these medicationscephALEXin (KEFLEX) 500 MG capsuleComments:Reason for Stopping:naproxen (NAPROSYN) 500 MG tabletComments:Reason for Stopping:albuterol sulfate HFA (PROAIR HFA) 108 (90 Base) MCG/ACT inhalerComments:Reason for Stopping:    Time Spent on Discharge:1E] minutes were spent in patient examination, evaluation, counseling as well as medication reconciliation, prescriptions for required medications, discharge plan, and follow up.     Electronically signed by Harini Espino MD on 9/27/20 at 5:50 PM EDT

## 2020-09-27 NOTE — PROGRESS NOTES
[address] [place] [address] [facility]  Progress Note    SUBJECTIVE:  Chief Complaint   Patient presents with    Abscess     on buttocks; The patient is feeling better. She has less pain. Tolerating antibiotics. Wants to go home. Review of systems:  As stated above in the chief complaint, otherwise negative. Medications:  Scheduled Meds:   ALPRAZolam  2 mg Oral BID    lurasidone  80 mg Oral Daily    enoxaparin  40 mg Subcutaneous Daily    piperacillin-tazobactam  3.375 g Intravenous Q8H    linezolid  600 mg Oral 2 times per day     Continuous Infusions:   sodium chloride Stopped (20 1115)     PRN Meds:oxyCODONE-acetaminophen    OBJECTIVE:  BP (!) 110/52   Pulse 88   Temp 97.6 °F (36.4 °C) (Temporal)   Resp 18   Ht 5' 2\" (1.575 m)   Wt 112 lb (50.8 kg)   SpO2 98%   BMI 20.49 kg/m²   Temp  Av °F (37.2 °C)  Min: 97.6 °F (36.4 °C)  Max: 100.4 °F (38 °C)  Constitutional: The patient is awake, alert, and oriented. Skin: Warm and dry. No rashes were noted. HEENT: Round and reactive pupils. Moist mucous membranes. No ulcerations or thrush. Neck: Supple to movements. Chest: No use of accessory muscles to breathe. Symmetrical expansion. No wheezing, crackles or rhonchi. Cardiovascular: S1 and S2 are rhythmic and regular. No murmurs appreciated. Abdomen: Positive bowel sounds to auscultation. Benign to palpation. No masses felt. No hepatosplenomegaly. Right buttock shows an open wound that has been debrided and is packed. The satellite lesions on the right buttock and left buttock are improving. The one on the right is slightly indurated. Extremities: No clubbing, no cyanosis, no edema.   Lines: peripheral    Laboratory and Tests Review:  Lab Results   Component Value Date    WBC 9.9 2020    WBC 12.5 (H) 2020    WBC 14.9 (H) 2020    HGB 12.5 2020    HCT 37.8 2020    MCV 89.8 2020     2020     Lab Results Component Value Date    NEUTROABS 10.75 (H) 09/25/2020    NEUTROABS 3.39 08/02/2020     No results found for: CRPHS  Lab Results   Component Value Date    ALT 5 09/25/2020    AST 12 09/25/2020    ALKPHOS 64 09/25/2020    BILITOT 0.4 09/25/2020     Lab Results   Component Value Date     09/27/2020    K 4.1 09/27/2020    K 3.5 09/25/2020     09/27/2020    CO2 21 09/27/2020    BUN 10 09/27/2020    CREATININE 0.7 09/27/2020    CREATININE 0.7 09/26/2020    CREATININE 0.7 09/25/2020    GFRAA >60 09/27/2020    LABGLOM >60 09/27/2020    GLUCOSE 137 09/27/2020    PROT 6.8 09/25/2020    LABALBU 3.7 09/25/2020    CALCIUM 8.5 09/27/2020    BILITOT 0.4 09/25/2020    ALKPHOS 64 09/25/2020    AST 12 09/25/2020    ALT 5 09/25/2020     Lab Results   Component Value Date    CRP 6.1 (H) 09/26/2020     Lab Results   Component Value Date    SEDRATE 14 09/26/2020     Radiology:      Microbiology:   Wound culture right buttock 9/26/2020 Staphylococcus aureus  Blood cultures 9/25/2020: Negative so far    ASSESSMENT:  · Complicated skin and soft tissue infection of the right buttock. Improving. The patient also has some satellite lesions. ASO titer is elevated. · Leukocytosis secondary to above, resolving    PLAN:  · Continue oral Linezolid.   Reconciled x12 days  · Stop Zosyn  · The patient can be discharged from ID standpoint    Misael Arredondo  12:11 PM  9/27/2020

## 2020-09-27 NOTE — PLAN OF CARE
Problem: Infection:  Goal: Will remain free from infection  Description: Will remain free from infection  9/26/2020 1058 by Jessica Christiansne RN  Outcome: Met This Shift     Problem: Safety:  Goal: Free from accidental physical injury  Description: Free from accidental physical injury  9/26/2020 2338 by Jen Buckner LPN  Outcome: Met This Shift  9/26/2020 1058 by Jessica Christiansen RN  Outcome: Met This Shift  Goal: Free from intentional harm  Description: Free from intentional harm  9/26/2020 2338 by Jen Buckner LPN  Outcome: Met This Shift  9/26/2020 1058 by Jessica Christiansen RN  Outcome: Met This Shift     Problem: Daily Care:  Goal: Daily care needs are met  Description: Daily care needs are met  9/26/2020 1058 by Jessica Christiansen RN  Outcome: Met This Shift     Problem: Pain:  Goal: Patient's pain/discomfort is manageable  Description: Patient's pain/discomfort is manageable  9/26/2020 1058 by Jessica Christiansen RN  Outcome: Met This Shift  Goal: Pain level will decrease  Description: Pain level will decrease  9/26/2020 1058 by Jessica Christiansen RN  Outcome: Met This Shift  Goal: Control of acute pain  Description: Control of acute pain  9/26/2020 1058 by Jessica Christiansen RN  Outcome: Met This Shift  Goal: Control of chronic pain  Description: Control of chronic pain  9/26/2020 1058 by Jessica Christiansen RN  Outcome: Met This Shift     Problem: Skin Integrity:  Goal: Skin integrity will stabilize  Description: Skin integrity will stabilize  9/26/2020 1058 by Jessica Christiansen RN  Outcome: Met This Shift     Problem: Discharge Planning:  Goal: Patients continuum of care needs are met  Description: Patients continuum of care needs are met  9/26/2020 1058 by Jessica Christiansen RN  Outcome: Met This Shift     Problem: Falls - Risk of:  Goal: Will remain free from falls  Description: Will remain free from falls  9/26/2020 1058 by Jessica Christiansen RN  Outcome: Met This Shift  Goal: Absence of physical injury  Description: Absence of physical injury  9/26/2020 1058 by Anne Watson RN  Outcome: Met This Shift

## 2020-09-27 NOTE — PROGRESS NOTES
Post op  S/p ID abscess   Wounds ok, tenderness much improved, cellulitis improving  Cont abx and local wound care  Jeaneth Leroy MD

## 2020-09-27 NOTE — PROGRESS NOTES
Progress Note  Date:2020       Room:8410/8410-B  Patient Yaw Somers     YOB: 1985     Age:35 y.o. Patient says her pain is much improved today. Subjective    Subjective:  Symptoms:  Improved. No shortness of breath or chest pain. Diet:  Adequate intake. Activity level: Impaired due to pain. Pain:  She complains of pain that is mild. Review of Systems   Constitutional: Negative for activity change and fever. HENT: Negative for congestion. Respiratory: Negative for shortness of breath. Cardiovascular: Negative for chest pain. Gastrointestinal: Negative for abdominal pain. Genitourinary: Negative for difficulty urinating. Musculoskeletal: Negative for arthralgias. Neurological: Negative for dizziness. Hematological: Negative for adenopathy. Psychiatric/Behavioral: Negative for agitation. Objective         Vitals Last 24 Hours:  TEMPERATURE:  Temp  Av °F (37.2 °C)  Min: 97.6 °F (36.4 °C)  Max: 100.4 °F (38 °C)  RESPIRATIONS RANGE: Resp  Av  Min: 18  Max: 18  PULSE OXIMETRY RANGE: SpO2  Av.3 %  Min: 94 %  Max: 98 %  PULSE RANGE: Pulse  Av.3  Min: 81  Max: 93  BLOOD PRESSURE RANGE: Systolic (33DQM), CIV:763 , Min:95 , PAR:687   ; Diastolic (18TNT), OTJ:49, Min:52, Max:57    I/O (24Hr): Intake/Output Summary (Last 24 hours) at 2020 0835  Last data filed at 2020 7765  Gross per 24 hour   Intake 720 ml   Output 0 ml   Net 720 ml     Objective:  General Appearance:  Comfortable. Vital signs: (most recent): Blood pressure (!) 110/52, pulse 88, temperature 97.6 °F (36.4 °C), temperature source Temporal, resp. rate 18, height 5' 2\" (1.575 m), weight 112 lb (50.8 kg), SpO2 98 %, not currently breastfeeding. No fever. Lungs:  Normal effort and normal respiratory rate. Breath sounds clear to auscultation. Heart: Normal rate. Regular rhythm. S1 normal and S2 normal.    Skin:  Warm. There is a rash. Labs/Imaging/Diagnostics    Labs:  CBC:  Recent Labs     20  0503 20  0621   WBC 14.9* 12.5* 9.9   RBC 4.09 3.98 4.21   HGB 12.8 11.9 12.5   HCT 37.1 35.7 37.8   MCV 90.7 89.7 89.8   RDW 13.2 13.2 12.9    181 175     CHEMISTRIES:  Recent Labs     20  0503 20  0621    139 137   K 3.5 3.2* 4.1   CL 98 104 102   CO2 29 25 21*   BUN 17 13 10   CREATININE 0.7 0.7 0.7   GLUCOSE 101* 109* 137*   MG 1.8  --   --      PT/INR:No results for input(s): PROTIME, INR in the last 72 hours. APTT:No results for input(s): APTT in the last 72 hours. LIVER PROFILE:  Recent Labs     20   AST 12   ALT 5   BILITOT 0.4   ALKPHOS 64       Imaging Last 24 Hours:  Ct Abdomen Pelvis W Iv Contrast Additional Contrast? None    Result Date: 2020  Patient MRN:  [id number] : 1985 Age: 38 years Gender: Female Order Date:  2020 10:10 PM TECHNIQUE/NUMBER OF IMAGES/COMPARISON/CLINICAL HISTORY: CT scanning abdomen and pelvis Axial images were obtained with sagittal and coronal reconstructions 402 images IV contrast 700 mL of Isovue-370 History abscesses in the bilateral areas. There is 68-year-old female patient. FINDINGS: There are soft tissue edema and thickening of the dermis represents a dermatocellulitis in the more posterior inferior inner aspect of the right and left but tiny areas more prominent on the right. The it extends into the perivulvar region more on the right than on the left-sided. There is no extension into the ischiorectal fossa bilateral or to the immediate the perianal area. No abscess formation seen. There is no air in the soft tissues. There are normal size and enhancement for the liver. Several hypoenhancing lesions are seen in the liver the small size of few millimeters likely to represent small cysts but they are too small to be characterized. Ultrasound can be helpful for follow up.  A similar findings are seen in the right and

## 2020-09-28 LAB
ANAEROBIC CULTURE: NORMAL
ORGANISM: ABNORMAL
ORGANISM: ABNORMAL
WOUND/ABSCESS: ABNORMAL
WOUND/ABSCESS: ABNORMAL

## 2020-10-01 LAB
BLOOD CULTURE, ROUTINE: NORMAL
CULTURE, BLOOD 2: NORMAL

## 2020-10-27 ENCOUNTER — HOSPITAL ENCOUNTER (EMERGENCY)
Age: 35
Discharge: HOME | End: 2020-10-27
Payer: MEDICAID

## 2020-10-27 VITALS
OXYGEN SATURATION: 96 % | RESPIRATION RATE: 18 BRPM | DIASTOLIC BLOOD PRESSURE: 85 MMHG | HEART RATE: 100 BPM | TEMPERATURE: 96.6 F | SYSTOLIC BLOOD PRESSURE: 134 MMHG

## 2020-10-27 LAB
HCG, URINE, POC: NEGATIVE
Lab: NORMAL
NEGATIVE QC PASS/FAIL: NORMAL
POSITIVE QC PASS/FAIL: NORMAL

## 2020-10-27 PROCEDURE — 6370000000 HC RX 637 (ALT 250 FOR IP): Performed by: NURSE PRACTITIONER

## 2020-10-27 PROCEDURE — 87186 SC STD MICRODIL/AGAR DIL: CPT

## 2020-10-27 PROCEDURE — 99283 EMERGENCY DEPT VISIT LOW MDM: CPT

## 2020-10-27 PROCEDURE — 87205 SMEAR GRAM STAIN: CPT

## 2020-10-27 PROCEDURE — 87070 CULTURE OTHR SPECIMN AEROBIC: CPT

## 2020-10-27 PROCEDURE — 10061 I&D ABSCESS COMP/MULTIPLE: CPT

## 2020-10-27 PROCEDURE — 2500000003 HC RX 250 WO HCPCS: Performed by: NURSE PRACTITIONER

## 2020-10-27 RX ORDER — NAPROXEN 500 MG/1
500 TABLET ORAL 2 TIMES DAILY WITH MEALS
Qty: 20 TABLET | Refills: 0 | Status: SHIPPED | OUTPATIENT
Start: 2020-10-27 | End: 2020-12-05

## 2020-10-27 RX ORDER — LIDOCAINE HYDROCHLORIDE AND EPINEPHRINE 10; 10 MG/ML; UG/ML
20 INJECTION, SOLUTION INFILTRATION; PERINEURAL ONCE
Status: COMPLETED | OUTPATIENT
Start: 2020-10-27 | End: 2020-10-27

## 2020-10-27 RX ORDER — DOXYCYCLINE HYCLATE 100 MG/1
100 CAPSULE ORAL ONCE
Status: COMPLETED | OUTPATIENT
Start: 2020-10-27 | End: 2020-10-27

## 2020-10-27 RX ORDER — NAPROXEN 500 MG/1
500 TABLET ORAL ONCE
Status: COMPLETED | OUTPATIENT
Start: 2020-10-27 | End: 2020-10-27

## 2020-10-27 RX ORDER — DOXYCYCLINE HYCLATE 100 MG
100 TABLET ORAL 2 TIMES DAILY
Qty: 20 TABLET | Refills: 0 | Status: SHIPPED | OUTPATIENT
Start: 2020-10-27 | End: 2020-11-06

## 2020-10-27 RX ADMIN — LIDOCAINE HYDROCHLORIDE AND EPINEPHRINE 20 ML: 10; 10 INJECTION, SOLUTION INFILTRATION; PERINEURAL at 20:34

## 2020-10-27 RX ADMIN — NAPROXEN 500 MG: 500 TABLET ORAL at 20:34

## 2020-10-27 RX ADMIN — DOXYCYCLINE HYCLATE 100 MG: 100 CAPSULE ORAL at 20:34

## 2020-10-27 ASSESSMENT — PAIN SCALES - GENERAL
PAINLEVEL_OUTOF10: 10
PAINLEVEL_OUTOF10: 10

## 2020-10-28 NOTE — ED PROVIDER NOTES
[facility]     Department of Emergency Medicine   ED  Provider Note  Admit Date/RoomTime: 10/27/2020  7:16 PM  ED Room: /  Chief Complaint   Wound Check (hc of MRSA on buttocks. pt feels infection is coming back )    History of Present Illness   Source of history provided by:  patient. History/Exam Limitations: none. Sudha Li is a 38 y.o. old female who has a past medical history of:   Past Medical History:   Diagnosis Date    Bipolar 1 disorder ([address]     Chronic neck pain     Migraines     Scoliosis     presents to the emergency department by private vehicle, for tender area on bilateral buttocks, which occured a few day(s) prior to arrival.  Since onset the symptoms have been stable, associated with pain and mild in severity. She has a history of Cutaneous abscess. She just finished a prescription for Zyvox and was being followed by general surgery and infectious disease for abscess and cellulitis of a another area of the buttocks. She denies any fevers or chills. ROS   Pertinent positives and negatives are stated within HPI, all other systems reviewed and are negative. Past Surgical History:   Procedure Laterality Date    HERNIA REPAIR      ventral hernia repair 3 years ago   Social History:  reports that she has been smoking cigarettes. She has a 7.50 pack-year smoking history. She has never used smokeless tobacco. She reports current alcohol use. She reports that she does not use drugs. Family History: family history includes Cancer in her paternal grandfather and paternal grandmother; Heart Disease in her maternal grandmother. Allergies: Patient has no known allergies.     Physical Exam           ED Triage Vitals   BP Temp Temp src Pulse Resp SpO2 Height Weight   10/27/20 1915 10/27/20 1912 -- 10/27/20 2125 10/27/20 1915 10/27/20 1912 -- --   134/85 96.6 °F (35.9 °C)  100 18 96 %        Oxygen Saturation Interpretation: Normal.    Constitutional:  Alert, development consistent with age. HEENT:  NC/NT. Airway patent  Neck:  Normal ROM. Supple. Respiratory:  Clear to auscultation and breath sounds equal.  CV:  Regular rate and rhythm, normal heart sounds, without pathological murmurs, ectopy, gallops, or rubs. GI:  Abdomen Soft, nontender, good bowel sounds. No firm or pulsatile mass. Back:  No costovertebral tenderness. Extremities: No tenderness or edema noted. Integument:  1 cm  Indurated  abscess to left medial buttocks, 1.5 cm indurated abscess to right medial gluteal cleft. Previous incision and drainage site inferior to abscess to right medial gluteal cleft well-healing, no erythema, warmth or induration. Normal turgor. Warm, dry, without visible rash, unless noted elsewhere. Lymphatics: No lymphangitis or adenopathy noted. Neurological:  Oriented. Motor functions intact. Lab / Imaging Results   (All laboratory and radiology results have been personally reviewed by myself)  Labs:  Results for orders placed or performed during the hospital encounter of 10/27/20   POC Pregnancy Urine Qual   Result Value Ref Range    HCG, Urine, POC Negative Negative    Lot Number [id number]     Positive QC Pass/Fail Pass     Negative QC Pass/Fail Pass      Imaging: All Radiology results interpreted by Radiologist unless otherwise noted. No orders to display       ED Course / Medical Decision Making     Medications   lidocaine-EPINEPHrine 1 percent-[id number] injection 20 mL (20 mLs Intradermal Given by Other 10/27/20 2034)   naproxen (NAPROSYN) tablet 500 mg (500 mg Oral Given 10/27/20 2034)   doxycycline hyclate (VIBRAMYCIN) capsule 100 mg (100 mg Oral Given 10/27/20 2034)          Consult(s):   None    Procedure(s):     PROCEDURE  10/27/20       Time: 2000    INCISION AND DRAINAGE  Risks, benefits and alternatives (for applicable procedures below) described. Performed By: SOLOMON Berg - CNP.     Indication: Abscess  Informed consent obtained: The patient provided verbal consent for this procedure. .  Prep: The skin was cleansed with povidone iodine and draped in a sterile fashion. Anesthetic: The wound area was anesthetized with Lidocaine 1% with epinephrine. Incision: Soft tissue abscess of buttocks was Incised by stab inscison and minimal, bloody fluid was drained. A wound culture was obtained. The wound  was not irrigated and was not packed with iodoform gauze. The wound was then covered with a sterile dressing. Patient tolerated the procedure well. MDM:      Plan is for treatment with analgesics, ATB's, warm compress and appropriate outpatient follow-up with PCP or general surgery. Patient is urged to take all ATB to completion unless and adverse reaction develops. Return to ED for new/worsening symptoms. Counseling: The emergency provider has spoken with the patient and discussed todays results, in addition to providing specific details for the plan of care and counseling regarding the diagnosis and prognosis. Questions are answered at this time and they are agreeable with the plan. Assessment      1. Abscess      Plan   Discharge to home  Patient condition is good    New Medications     Discharge Medication List as of 10/27/2020  8:04 PM      START taking these medications    Details   doxycycline hyclate (VIBRA-TABS) 100 MG tablet Take 1 tablet by mouth 2 times daily for 10 days, Disp-20 tablet,R-0Print      naproxen (NAPROSYN) 500 MG tablet Take 1 tablet by mouth 2 times daily (with meals), Disp-20 tablet,R-0Print           Electronically signed by SOLOMON Torres CNP   DD: 10/27/20  **This report was transcribed using voice recognition software. Every effort was made to ensure accuracy; however, inadvertent computerized transcription errors may be present.   END OF ED PROVIDER NOTE      SOLOMON Anderson CNP  10/27/20 3257

## 2020-10-30 LAB
GRAM STAIN RESULT: ABNORMAL
ORGANISM: ABNORMAL
WOUND/ABSCESS: ABNORMAL

## 2020-12-05 ENCOUNTER — HOSPITAL ENCOUNTER (EMERGENCY)
Age: 35
Discharge: HOME | End: 2020-12-05
Payer: MEDICAID

## 2020-12-05 VITALS
BODY MASS INDEX: 21.16 KG/M2 | DIASTOLIC BLOOD PRESSURE: 60 MMHG | WEIGHT: 115 LBS | RESPIRATION RATE: 16 BRPM | HEART RATE: 82 BPM | TEMPERATURE: 97.8 F | HEIGHT: 62 IN | OXYGEN SATURATION: 100 % | SYSTOLIC BLOOD PRESSURE: 105 MMHG

## 2020-12-05 LAB — HCG(URINE) PREGNANCY TEST: NEGATIVE

## 2020-12-05 PROCEDURE — 99283 EMERGENCY DEPT VISIT LOW MDM: CPT

## 2020-12-05 PROCEDURE — 87205 SMEAR GRAM STAIN: CPT

## 2020-12-05 PROCEDURE — 6370000000 HC RX 637 (ALT 250 FOR IP): Performed by: NURSE PRACTITIONER

## 2020-12-05 PROCEDURE — 56405 I&D VULVA/PERINEAL ABSCESS: CPT

## 2020-12-05 PROCEDURE — 2500000003 HC RX 250 WO HCPCS: Performed by: NURSE PRACTITIONER

## 2020-12-05 PROCEDURE — 87070 CULTURE OTHR SPECIMN AEROBIC: CPT

## 2020-12-05 PROCEDURE — 87186 SC STD MICRODIL/AGAR DIL: CPT

## 2020-12-05 PROCEDURE — 81025 URINE PREGNANCY TEST: CPT

## 2020-12-05 RX ORDER — DOXYCYCLINE HYCLATE 100 MG/1
100 CAPSULE ORAL ONCE
Status: COMPLETED | OUTPATIENT
Start: 2020-12-05 | End: 2020-12-05

## 2020-12-05 RX ORDER — HYDROCODONE BITARTRATE AND ACETAMINOPHEN 5; 325 MG/1; MG/1
1 TABLET ORAL EVERY 8 HOURS PRN
Qty: 9 TABLET | Refills: 0 | Status: SHIPPED | OUTPATIENT
Start: 2020-12-05 | End: 2020-12-08

## 2020-12-05 RX ORDER — LIDOCAINE HYDROCHLORIDE 10 MG/ML
5 INJECTION, SOLUTION INFILTRATION; PERINEURAL ONCE
Status: COMPLETED | OUTPATIENT
Start: 2020-12-05 | End: 2020-12-05

## 2020-12-05 RX ORDER — DOXYCYCLINE HYCLATE 100 MG
100 TABLET ORAL 2 TIMES DAILY
Qty: 20 TABLET | Refills: 0 | Status: SHIPPED | OUTPATIENT
Start: 2020-12-05 | End: 2020-12-15

## 2020-12-05 RX ADMIN — LIDOCAINE HYDROCHLORIDE 5 ML: 10 INJECTION, SOLUTION INFILTRATION; PERINEURAL at 14:07

## 2020-12-05 RX ADMIN — DOXYCYCLINE HYCLATE 100 MG: 100 CAPSULE ORAL at 15:51

## 2020-12-05 ASSESSMENT — PAIN DESCRIPTION - DESCRIPTORS: DESCRIPTORS: SHARP

## 2020-12-05 ASSESSMENT — PAIN DESCRIPTION - PROGRESSION: CLINICAL_PROGRESSION: GRADUALLY WORSENING

## 2020-12-05 ASSESSMENT — PAIN DESCRIPTION - LOCATION: LOCATION: VAGINA

## 2020-12-05 ASSESSMENT — PAIN DESCRIPTION - PAIN TYPE: TYPE: ACUTE PAIN

## 2020-12-05 ASSESSMENT — PAIN DESCRIPTION - FREQUENCY: FREQUENCY: CONTINUOUS

## 2020-12-05 ASSESSMENT — PAIN DESCRIPTION - ONSET: ONSET: GRADUAL

## 2020-12-05 ASSESSMENT — PAIN SCALES - GENERAL: PAINLEVEL_OUTOF10: 8

## 2020-12-05 NOTE — ED PROVIDER NOTES
[address]  Department of Emergency Medicine   ED  Encounter Note  Admit Date/RoomTime: 2020 12:53 PM  ED Room:     NAME: Pastor Pedersen  : 1985  MRN: [id number]     Chief Complaint:  Vaginal Pain (abscess to vaginal area, starts out as a hard lump under the skin but then comes to a head. Noticed about 7 days ago.  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 old female who presents to the emergency department for complaint of abscess to left labia. Patient reports onset of symptoms approximately 7 days ago. States history of abscess formations. Denies fever, chills, nausea, vomiting, abdominal pain, back pain, dysuria, urinary frequency, vaginal discharge, constipation, diarrhea, or any other complaints at this time. Since onset the symptoms have been unchanged, associated with pain and mild-moderate in severity. Patient reports that in the past she has required admission for abscess to her buttocks, and states that she wanted to get here early so it did not worsen. ROS   Pertinent positives and negatives are stated within HPI, all other systems reviewed and are negative. Past Medical History:  has a past medical history of Bipolar 1 disorder ([address] Chronic neck pain, Migraines, and Scoliosis. Surgical History:  has a past surgical history that includes hernia repair. Social History:  reports that she has been smoking cigarettes. She has a 7.50 pack-year smoking history. She has never used smokeless tobacco. She reports current alcohol use. She reports that she does not use drugs. Family History: family history includes Cancer in her paternal grandfather and paternal grandmother; Heart Disease in her maternal grandmother. Allergies: Patient has no known allergies.     Physical Exam   Oxygen Saturation Interpretation: Normal.        ED Triage Vitals   BP Temp Temp Source Pulse Resp SpO2 Height Weight   20 1254 12/05/20 1254 12/05/20 1254 12/05/20 1254 12/05/20 1254 12/05/20 1254 12/05/20 1304 12/05/20 1304   105/60 97.8 °F (36.6 °C) Temporal 95 16 97 % 5' 2\" (1.575 m) 115 lb (52.2 kg)         Constitutional:  Alert, development consistent with age. HEENT:  NC/NT. Airway patent  Neck:  Normal ROM. Supple. Respiratory:  Clear to auscultation and breath sounds equal.  CV:  Regular rate and rhythm, normal heart sounds, without pathological murmurs, ectopy, gallops, or rubs. Peripheral pulses 2+ palpable  GI:  Abdomen Soft, nontender, good bowel sounds. No firm or pulsatile mass. : 2 cm x 2 cm abscess present to left labia majora. No drainage. Area is localized with no surrounding erythema. There is no streaking of erythema or extension into vaginal region. Back:  No costovertebral tenderness. Extremities: No tenderness or edema noted. Integument:  Normal turgor. Warm, dry, without visible rash, unless noted elsewhere. Lymphatics: No lymphangitis or adenopathy noted. Neurological:  Oriented. Motor functions intact. Lab / Imaging Results   (All laboratory and radiology results have been personally reviewed by myself)  Labs:  Results for orders placed or performed during the hospital encounter of 12/05/20   Pregnancy, Urine   Result Value Ref Range    HCG(Urine) Pregnancy Test NEGATIVE NEGATIVE     Imaging: All Radiology results interpreted by Radiologist unless otherwise noted. No orders to display       ED Course / Medical Decision Making     Medications   lidocaine 1 % injection 5 mL (5 mLs Intradermal Given by Other 12/5/20 5807)   doxycycline hyclate (VIBRAMYCIN) capsule 100 mg (100 mg Oral Given 12/5/20 3901)        Consult(s):   None    Procedure(s):     PROCEDURE  12/5/20        INCISION AND DRAINAGE  Risks, benefits and alternatives (for applicable procedures below) described. Performed By: SOLOMON Hernandez CNP.     Indication: Abscess  Informed consent obtained: The patient provided verbal consent for this procedure. .  Prep: The skin was irrigated with sterile saline, cleansed with povidone iodine and draped in a sterile fashion. Anesthetic: The wound area was anesthetized with Lidocaine 1% without epinephrine. Incision: Soft tissue abscess of Labia was Incised by scalpal and sebum fluid was drained. A wound culture was obtained. The wound  was irrigated and was not packed with iodoform gauze. The wound was then covered with a sterile dressing. Patient tolerated the procedure well. MDM:      Catalino Burton is a 61-year-old female who presented to the emergency department for complaint of abscess to left labia. Patient reported initial onset 7 days ago. No fever or chills no nausea or vomiting. No urinary complaints or vaginal complaints. On physical exam she was noted to have a 2 x 2 centimeter red raised abscess to left lateral labia majora. Abscess was localized, no surrounding erythema or streaking into the vaginal region. No tachycardia or concerns for systemic infection. She reported history of abscess formations. I&D was completed as stated above. Patient tolerated well. She was prescribed doxycycline. She remained hemodynamically stable, nontoxic, and appropriate for outpatient management. She is presently under the care of Dr. Mayda Oseguera general surgery and has an appointment in 2 weeks. She was instructed to follow-up as scheduled and advised to return for any new, change, worsening symptoms or concerns. At this time the patient is without objective evidence of an acute process requiring hospitalization or inpatient management. They have remained hemodynamically stable throughout their entire ED visit and are stable for discharge with outpatient follow-up. The plan has been discussed in detail and they are aware of the specific conditions for emergent return, as well as the importance of follow-up.     Plan of Care/Counseling:  I reviewed today's visit with the patient in addition to providing specific details for the plan of care and counseling regarding the diagnosis and prognosis. Questions are answered at this time and are agreeable with the plan. Assessment      1. Abscess      Plan   Discharge to home  Patient condition is good    New Medications     Discharge Medication List as of 12/5/2020  3:39 PM      START taking these medications    Details   HYDROcodone-acetaminophen (NORCO) 5-325 MG per tablet Take 1 tablet by mouth every 8 hours as needed for Pain for up to 3 days. Intended supply: 3 days. Take lowest dose possible to manage pain, Disp-9 tablet,R-0Print      doxycycline hyclate (VIBRA-TABS) 100 MG tablet Take 1 tablet by mouth 2 times daily for 10 days, Disp-20 tablet,R-0Print           Electronically signed by SOLOMON Arroyo CNP   DD: 12/5/20  **This report was transcribed using voice recognition software. Every effort was made to ensure accuracy; however, inadvertent computerized transcription errors may be present.   END OF ED PROVIDER NOTE      SOLOMON Arroyo CNP  12/05/20 5224

## 2020-12-08 LAB
GRAM STAIN RESULT: ABNORMAL
ORGANISM: ABNORMAL
WOUND/ABSCESS: ABNORMAL

## 2021-01-17 ENCOUNTER — HOSPITAL ENCOUNTER (EMERGENCY)
Age: 36
Discharge: HOME | End: 2021-01-17
Attending: FAMILY MEDICINE
Payer: MEDICAID

## 2021-01-17 VITALS
BODY MASS INDEX: 20.98 KG/M2 | HEIGHT: 62 IN | SYSTOLIC BLOOD PRESSURE: 128 MMHG | TEMPERATURE: 97.8 F | RESPIRATION RATE: 16 BRPM | DIASTOLIC BLOOD PRESSURE: 82 MMHG | WEIGHT: 114 LBS | OXYGEN SATURATION: 100 % | HEART RATE: 104 BPM

## 2021-01-17 DIAGNOSIS — L02.31 ABSCESS OF LEFT BUTTOCK: Primary | ICD-10-CM

## 2021-01-17 PROCEDURE — 99282 EMERGENCY DEPT VISIT SF MDM: CPT

## 2021-01-17 PROCEDURE — 99283 EMERGENCY DEPT VISIT LOW MDM: CPT

## 2021-01-17 RX ORDER — SULFAMETHOXAZOLE AND TRIMETHOPRIM 800; 160 MG/1; MG/1
1 TABLET ORAL 2 TIMES DAILY
Qty: 20 TABLET | Refills: 0 | Status: SHIPPED | OUTPATIENT
Start: 2021-01-17 | End: 2021-01-27

## 2021-01-17 ASSESSMENT — PAIN DESCRIPTION - PAIN TYPE: TYPE: ACUTE PAIN

## 2021-01-17 ASSESSMENT — PAIN DESCRIPTION - LOCATION: LOCATION: BUTTOCKS

## 2021-01-17 ASSESSMENT — PAIN DESCRIPTION - DESCRIPTORS: DESCRIPTORS: PRESSURE

## 2021-01-17 ASSESSMENT — PAIN DESCRIPTION - ORIENTATION: ORIENTATION: LEFT

## 2021-01-17 ASSESSMENT — PAIN DESCRIPTION - ONSET: ONSET: SUDDEN

## 2021-01-17 NOTE — ED PROVIDER NOTES
[address] [facility]  Department of Emergency Medicine   ED  Encounter Note  Admit Date/RoomTime: 2021  2:27 PM  ED Room:     NAME: Basil Lam  : 1985  MRN: [id number]     Chief Complaint:  Abscess (left buttock. )    History of Present Illness        Basil Lam is a 38 y.o. old female who presents to the emergency department by private vehicle, for abscess on buttock for several days states she opened it up and has been nursing it there is been drainage and today she had significant amount of drainage from area. History of previous abscess has been followed by surgery and infectious disease. There is been no fever chills nausea or vomiting no abdominal pain no rectal pain. ROS   Pertinent positives and negatives are stated within HPI, all other systems reviewed and are negative. Past Medical History:  has a past medical history of Abscess, Bipolar 1 disorder ([address] Chronic neck pain, Migraines, and Scoliosis. Surgical History:  has a past surgical history that includes hernia repair. Social History:  reports that she has been smoking cigarettes. She has a 7.50 pack-year smoking history. She has never used smokeless tobacco. She reports current alcohol use. She reports that she does not use drugs. Family History: family history includes Cancer in her paternal grandfather and paternal grandmother; Heart Disease in her maternal grandmother. Allergies: Patient has no known allergies. Physical Exam   Oxygen Saturation Interpretation: Normal.        ED Triage Vitals   BP Temp Temp Source Pulse Resp SpO2 Height Weight   21 1424 21 1424 21 1424 21 1424 21 1424 21 1424 21 1435 21 1435   128/82 97.8 °F (36.6 °C) Infrared 104 16 100 % 5' 2\" (1.575 m) 114 lb (51.7 kg)         Constitutional:  Alert, development consistent with age. HEENT:  NC/NT. Airway patent  Neck:  Normal ROM. Supple.   Respiratory:  Clear to auscultation and breath sounds equal.  CV:  Regular rate and rhythm, normal heart sounds, without pathological murmurs, ectopy, gallops, or rubs. GI:  Abdomen Soft, nontender, good bowel sounds. No firm or pulsatile mass. Extremities: No tenderness or edema noted. Integument:  Normal turgor. Warm, dry, without visible rash, unless noted elsewhere. There is an open area of drainage left buttocks medially no fluctuance there is mild tenderness no significant induration. Lymphatics: No lymphangitis or adenopathy noted. Neurological:  Oriented. Motor functions intact. Lab / Imaging Results   (All laboratory and radiology results have been personally reviewed by myself)  Labs:  No results found for this visit on 01/17/21. Imaging: All Radiology results interpreted by Radiologist unless otherwise noted. No orders to display       ED Course / Medical Decision Making   Medications - No data to display     Consult(s):   None    Procedure(s):   None needed abscess draining on its own. MDM:      Plan is for treatment with analgesics, ATB's and appropriate outpatient follow-up. Patient is urged to take all ATB to completion unless and adverse reaction develops. Plan of Care/Counseling:  I reviewed today's visit with the patient in addition to providing specific details for the plan of care and counseling regarding the diagnosis and prognosis. Questions are answered at this time and are agreeable with the plan. Assessment      1. Abscess of left buttock      Plan   Discharged home  Patient condition is good    New Medications     New Prescriptions    SULFAMETHOXAZOLE-TRIMETHOPRIM (BACTRIM DS) 800-160 MG PER TABLET    Take 1 tablet by mouth 2 times daily for 10 days     Electronically signed by Shoshana Bradshaw MD   DD: 1/17/21  **This report was transcribed using voice recognition software.  Every effort was made to ensure accuracy; however, inadvertent computerized transcription errors may be present.   END OF ED PROVIDER NOTE        Raj Scott MD  01/17/21 2417

## 2021-03-15 ENCOUNTER — HOSPITAL ENCOUNTER (EMERGENCY)
Age: 36
Discharge: HOME | End: 2021-03-15
Attending: EMERGENCY MEDICINE
Payer: MEDICAID

## 2021-03-15 VITALS
HEART RATE: 77 BPM | HEIGHT: 62 IN | DIASTOLIC BLOOD PRESSURE: 65 MMHG | OXYGEN SATURATION: 99 % | TEMPERATURE: 97.3 F | BODY MASS INDEX: 53.92 KG/M2 | SYSTOLIC BLOOD PRESSURE: 109 MMHG | WEIGHT: 293 LBS | RESPIRATION RATE: 16 BRPM

## 2021-03-15 DIAGNOSIS — L03.818 CELLULITIS OF OTHER SPECIFIED SITE: Primary | ICD-10-CM

## 2021-03-15 PROCEDURE — 99283 EMERGENCY DEPT VISIT LOW MDM: CPT

## 2021-03-15 RX ORDER — LANOLIN ALCOHOL/MO/W.PET/CERES
3 CREAM (GRAM) TOPICAL NIGHTLY PRN
COMMUNITY
End: 2021-03-15

## 2021-03-15 RX ORDER — BUPROPION HYDROCHLORIDE 100 MG/1
100 TABLET, EXTENDED RELEASE ORAL DAILY
COMMUNITY
End: 2021-12-27

## 2021-03-15 RX ORDER — DOXYCYCLINE HYCLATE 100 MG
100 TABLET ORAL 2 TIMES DAILY
Qty: 20 TABLET | Refills: 0 | Status: SHIPPED | OUTPATIENT
Start: 2021-03-15 | End: 2021-03-25

## 2021-03-15 RX ORDER — HYDROXYZINE PAMOATE 25 MG/1
25 CAPSULE ORAL 3 TIMES DAILY PRN
Qty: 21 CAPSULE | Refills: 0 | Status: SHIPPED | OUTPATIENT
Start: 2021-03-15 | End: 2021-03-22

## 2021-03-15 NOTE — ED NOTES
Discharge instructions and prescriptions given. Patient states verbal understanding. Ambulatory to exit.        Jl Link RN  03/15/21 9923

## 2021-03-20 NOTE — ED PROVIDER NOTES
[facility]  Department of Emergency Medicine   ED  Encounter Note  Admit Date/RoomTime: 3/15/2021  6:10 PM  ED Room: 10/10  NAME: Sun Fan  : 1985  MRN: [id number]     Chief Complaint:  Other (needs antibiotics states she needs them)    History of Present Illness       Sun Fan is a 38 y.o. old female who presents to the emergency department by private vehicle, for intermittent episodes red and itchy area on  Lower abdomen which began several day(s) prior to arrival.  The symptoms were caused by unknown cause. Since onset the symptoms have been waxing and waning. She has had cutaneous abscess. Her symptoms are associated with rash and relieved by nothing. She denies any difficulty breathing, difficulty swallowing, wheezing, throat tightness, hoarseness, stridor, lightheadedness, facial swelling, lip swelling, tongue swelling, fever, chills, increased redness or increased swelling. ROS   Pertinent positives and negatives are stated within HPI, all other systems reviewed and are negative. Past Medical History:  has a past medical history of Abscess, Bipolar 1 disorder ([address] Chronic neck pain, Migraines, and Scoliosis. Surgical History:  has a past surgical history that includes hernia repair. Social History:  reports that she has been smoking cigarettes. She has a 7.50 pack-year smoking history. She has never used smokeless tobacco. She reports current alcohol use. She reports that she does not use drugs. Family History: family history includes Cancer in her paternal grandfather and paternal grandmother; Heart Disease in her maternal grandmother. Allergies: Patient has no known allergies.     Physical Exam   Oxygen Saturation Interpretation: Normal.        ED Triage Vitals   BP Temp Temp Source Pulse Resp SpO2 Height Weight   03/15/21 1802 03/15/21 1802 03/15/21 1802 03/15/21 1802 03/15/21 1802 03/15/21 1802 03/15/21 1814 03/15/21 1814 109/65 97.3 °F (36.3 °C) Temporal 77 16 99 % 5' 2\" (1.575 m) (!) 1128 lb (511.7 kg)         Constitutional:  Alert, development consistent with age. HEENT:  NC/NT. Airway patent. Eyes:  PERRL, EOMI, no discharge. Ears:  TMs without perforation, injection, or bulging. External canals clear without exudate. Mouth:  Mucous membranes moist without lesions, tongue and gums normal.  Throat:  Pharynx without injection, exudate, or tonsillar hypertrophy. Airway patient. Neck:  Supple. No lymphadenopathy. Respiratory:  Clear to auscultation and breath sounds equal.  CV:  Regular rate and rhythm. GI:  Abdomen Soft, nontender, +BS. Skin:  Skin turgor: Normal.              erythema measuring 2 cm by 3 cm to the lower abdomen  Lymphatics: No lymphangitis or adenopathy noted. Neurological:  Orientation age-appropriate unless noted elseware. Motor functions intact. Lab / Imaging Results   (All laboratory and radiology results have been personally reviewed by myself)  Labs:  No results found for this visit on 03/15/21. Imaging: All Radiology results interpreted by Radiologist unless otherwise noted. No orders to display       ED Course / Medical Decision Making   Medications - No data to display     Consults:   None    Procedures:   none    MDM:   At this time the patient is without objective evidence of an acute process requiring hospitalization or inpatient management. They have remained hemodynamically stable throughout their entire ED visit and are stable for discharge with outpatient follow-up. The plan has been discussed in detail and they are aware of the specific conditions for emergent return, as well as the importance of follow-up. She will be placed on oral antibiotics due to her history of recurrent abscesses and cellulitis.   Patient was educated on Dial soap and to follow-up with her primary care physician to also given referral to dermatology per her request.    Plan of Care/Counseling: I reviewed today's visit with the patient in addition to providing specific details for the plan of care and counseling regarding the diagnosis and prognosis. Questions are answered at this time and are agreeable with the plan. Assessment      1. Cellulitis of other specified site      Plan   Discharge home. Patient condition is good    New Medications     Discharge Medication List as of 3/15/2021  6:52 PM      START taking these medications    Details   doxycycline hyclate (VIBRA-TABS) 100 MG tablet Take 1 tablet by mouth 2 times daily for 10 days, Disp-20 tablet, R-0Normal      mupirocin (BACTROBAN) 2 % ointment Apply topically 3 times daily. , Disp-30 g, R-0, Normal      hydrOXYzine (VISTARIL) 25 MG capsule Take 1 capsule by mouth 3 times daily as needed for Itching, Disp-21 capsule, R-0Normal           Electronically signed by SOLOMON Lopes CNP   DD: 3/19/21  **This report was transcribed using voice recognition software. Every effort was made to ensure accuracy; however, inadvertent computerized transcription errors may be present.   END OF ED PROVIDER NOTE        SOLOMON Melvin CNP  03/19/21 2024

## 2021-12-27 ENCOUNTER — HOSPITAL ENCOUNTER (EMERGENCY)
Age: 36
Discharge: LEFT BEFORE DISCHARGE | End: 2021-12-27

## 2021-12-27 VITALS
TEMPERATURE: 98 F | OXYGEN SATURATION: 99 % | DIASTOLIC BLOOD PRESSURE: 63 MMHG | SYSTOLIC BLOOD PRESSURE: 109 MMHG | BODY MASS INDEX: 20.49 KG/M2 | HEART RATE: 70 BPM | WEIGHT: 112 LBS | RESPIRATION RATE: 16 BRPM

## 2021-12-27 ASSESSMENT — PAIN SCALES - GENERAL: PAINLEVEL_OUTOF10: 7

## 2022-01-22 ENCOUNTER — HOSPITAL ENCOUNTER (EMERGENCY)
Age: 37
Discharge: HOME | End: 2022-01-22
Payer: MEDICAID

## 2022-01-22 VITALS
RESPIRATION RATE: 16 BRPM | OXYGEN SATURATION: 96 % | DIASTOLIC BLOOD PRESSURE: 57 MMHG | BODY MASS INDEX: 21.16 KG/M2 | SYSTOLIC BLOOD PRESSURE: 111 MMHG | HEIGHT: 62 IN | HEART RATE: 90 BPM | WEIGHT: 115 LBS | TEMPERATURE: 97.2 F

## 2022-01-22 DIAGNOSIS — K02.9 DENTAL DECAY: Primary | ICD-10-CM

## 2022-01-22 PROCEDURE — 96372 THER/PROPH/DIAG INJ SC/IM: CPT

## 2022-01-22 PROCEDURE — 6370000000 HC RX 637 (ALT 250 FOR IP): Performed by: NURSE PRACTITIONER

## 2022-01-22 PROCEDURE — 6360000002 HC RX W HCPCS: Performed by: NURSE PRACTITIONER

## 2022-01-22 PROCEDURE — 99282 EMERGENCY DEPT VISIT SF MDM: CPT

## 2022-01-22 RX ORDER — IBUPROFEN 600 MG/1
600 TABLET ORAL EVERY 6 HOURS PRN
Qty: 28 TABLET | Refills: 0 | Status: SHIPPED | OUTPATIENT
Start: 2022-01-22 | End: 2022-06-30 | Stop reason: ALTCHOICE

## 2022-01-22 RX ORDER — AMOXICILLIN 250 MG/1
500 CAPSULE ORAL ONCE
Status: COMPLETED | OUTPATIENT
Start: 2022-01-22 | End: 2022-01-22

## 2022-01-22 RX ORDER — AMOXICILLIN 500 MG/1
500 CAPSULE ORAL 2 TIMES DAILY
Qty: 14 CAPSULE | Refills: 0 | Status: SHIPPED | OUTPATIENT
Start: 2022-01-22 | End: 2022-01-29

## 2022-01-22 RX ORDER — KETOROLAC TROMETHAMINE 30 MG/ML
30 INJECTION, SOLUTION INTRAMUSCULAR; INTRAVENOUS ONCE
Status: COMPLETED | OUTPATIENT
Start: 2022-01-22 | End: 2022-01-22

## 2022-01-22 RX ORDER — CHLORHEXIDINE GLUCONATE 0.12 MG/ML
15 RINSE ORAL 2 TIMES DAILY
Qty: 210 ML | Refills: 0 | Status: SHIPPED | OUTPATIENT
Start: 2022-01-22 | End: 2022-01-29

## 2022-01-22 RX ADMIN — AMOXICILLIN 500 MG: 250 CAPSULE ORAL at 17:36

## 2022-01-22 RX ADMIN — KETOROLAC TROMETHAMINE 30 MG: 30 INJECTION, SOLUTION INTRAMUSCULAR at 17:38

## 2022-01-22 ASSESSMENT — PAIN SCALES - GENERAL: PAINLEVEL_OUTOF10: 9

## 2022-01-22 NOTE — ED PROVIDER NOTES
[address]  Department of Emergency Medicine   ED  Encounter Note  Admit Date/RoomTime: 2022  4:43 PM  ED Room: [facility]    NAME: Alo Magaña  : 1985  MRN: [id number]     Chief Complaint:  Dental Pain (LT lower dental pain)    History of Present Illness        Alo Magaña is a 43 y.o. old female who presents to the emergency department for evaluation of pain at her left lower wisdom tooth for the last few days. She states this has been a bad tooth for quite some time and she missed the appointment to have it extracted. She states upon brushing, more the tooth eroded away and she has been having sensitivity and increasing gum pain since. She has not been on any antibiotics for the last couple of months and she believes she may need to be restarted on antibiotics. She has been taking Motrin to help alleviate the pain but is out of Motrin the last couple of days. She has been using regular over-the-counter mouthwash with increased sensitivity which aggravates her pain. She denies any fever, chills, facial swelling, external redness or warmth to her face. She has been able to eat, drink and keep fluids down okay. Her symptoms are moderate in severity and persistent in nature. Qualifiers: Following Trauma:   no.     Tooth problematic in the past:   yes. Patient has a dentist:   yes: [address]. Recent Dental Procedure:   no.     ROS   Pertinent positives and negatives are stated within HPI, all other systems reviewed and are negative. Past Medical History:  has a past medical history of Abscess, Bipolar 1 disorder ([address] Chronic neck pain, Migraines, and Scoliosis. Surgical History:  has a past surgical history that includes hernia repair. Social History:  reports that she has been smoking cigarettes. She has a 7.50 pack-year smoking history. She has never used smokeless tobacco. She reports current alcohol use.  She reports that she does not use drugs. Family History: family history includes Cancer in her paternal grandfather and paternal grandmother; Heart Disease in her maternal grandmother. Allergies: Patient has no known allergies. Physical Exam   Oxygen Saturation Interpretation: Normal.        ED Triage Vitals   BP Temp Temp src Pulse Resp SpO2 Height Weight   01/22/22 1716 01/22/22 1711 -- 01/22/22 1639 01/22/22 1711 01/22/22 1639 01/22/22 1711 01/22/22 1711   (!) 111/57 97.2 °F (36.2 °C)  92 16 96 % 5' 2\" (1.575 m) 115 lb (52.2 kg)         Constitutional:  Alert, development consistent with age. HEENT:  NC/NT. Nares normal. External canals clear bilaterally with normal appearing TM's bilaterally. Airway patent. Neck:  Supple. Normal ROM. Lips:  upper and lower normal.  Mouth:  normal tongue and buccal mucosa. Floor of the mouth soft. Dental: Few missing teeth throughout the mouth. The tooth of discomfort is #17 that is moderately decayed with pain upon percussion with tongue blade. There is some localized erythema at the gumline without edema or pustule. Trismus: No.       Drooling: No.         Airway stridor: No.  Facial skin: bilateral no wounds, erythema, or swelling. Respiratory:  Clear to auscultation and breath sounds equal.  No respiratory distress or increased work of breathing. CV: Regular rate and rhythm, no murmur. Strong radial pulse. Integument:  No rashes, erythema or lesions present, unless noted elsewhere. Normal capillary refill. Neurological:  Alert and oriented. Motor and sensory functions intact. Lab / Imaging Results   (All laboratory and radiology results have been personally reviewed by myself)  Labs:  No results found for this visit on 01/22/22. Imaging: All Radiology results interpreted by Radiologist unless otherwise noted.   No orders to display     ED Course / Medical Decision Making     Medications   ketorolac (TORADOL) injection 30 mg (30 mg IntraMUSCular Given 1/22/22 1738)   amoxicillin (AMOXIL) capsule 500 mg (500 mg Oral Given 1/22/22 1736)        Consult(s):   Dental Resident was not consulted to see patient regarding complaint. Procedure(s):   None    MDM:  Emergent dental referral was not required at the time of exam. Plan is for medications as listed below and outpatient follow up with dentistry. Patient is nontoxic, without airway compromise and appropriate for this outpatient management plan. They are encouraged to call to make an appointment for follow-up as discussed as well as provided in a written handout of instructions. Patient was educated on signs and symptoms to watch for indicative of reevaluation in the emergency department setting to include any worsening of current symptoms despite the treatment provided. They are encouraged to present to [facility] were dental resident consultation could be considered if appropriate. Patient verbalized understanding of instructions and is amenable to this treatment plan. Patient departed in stable condition. Plan of Care/Counseling:  SOLOMON Glover CNP reviewed today's visit with the patient in addition to providing specific details for the plan of care and counseling regarding the diagnosis and prognosis. Questions are answered at this time and are agreeable with the plan. Assessment      1. Dental decay      Plan   Discharged home. Patient condition is stable    New Medications     New Prescriptions    AMOXICILLIN (AMOXIL) 500 MG CAPSULE    Take 1 capsule by mouth 2 times daily for 7 days    CHLORHEXIDINE (PERIDEX) 0.12 % SOLUTION    Take 15 mLs by mouth 2 times daily for 7 days    IBUPROFEN (IBU) 600 MG TABLET    Take 1 tablet by mouth every 6 hours as needed for Pain     Electronically signed by SOLOMON Glover CNP   DD: 1/22/22  **This report was transcribed using voice recognition software.  Every effort was made to ensure accuracy; however, inadvertent computerized transcription errors may be present.   END OF ED PROVIDER NOTE       James Oreilly, SOLOMON - ALEJANDRO  01/22/22 0933

## 2022-06-29 ENCOUNTER — HOSPITAL ENCOUNTER (EMERGENCY)
Age: 37
Discharge: HOME | End: 2022-06-30
Payer: MEDICAID

## 2022-06-29 DIAGNOSIS — M94.0 COSTOCHONDRITIS, ACUTE: Primary | ICD-10-CM

## 2022-06-29 DIAGNOSIS — R09.1 PLEURISY: ICD-10-CM

## 2022-06-29 PROCEDURE — 96374 THER/PROPH/DIAG INJ IV PUSH: CPT

## 2022-06-29 PROCEDURE — 99285 EMERGENCY DEPT VISIT HI MDM: CPT

## 2022-06-29 PROCEDURE — 96375 TX/PRO/DX INJ NEW DRUG ADDON: CPT

## 2022-06-29 RX ORDER — TETANUS AND DIPHTHERIA TOXOIDS ADSORBED 2; 2 [LF]/.5ML; [LF]/.5ML
0.5 INJECTION INTRAMUSCULAR ONCE
Status: DISCONTINUED | OUTPATIENT
Start: 2022-06-30 | End: 2022-06-29 | Stop reason: ALTCHOICE

## 2022-06-29 RX ORDER — ERYTHROMYCIN 5 MG/G
OINTMENT OPHTHALMIC ONCE
Status: DISCONTINUED | OUTPATIENT
Start: 2022-06-30 | End: 2022-06-30

## 2022-06-29 RX ORDER — KETOROLAC TROMETHAMINE 30 MG/ML
30 INJECTION, SOLUTION INTRAMUSCULAR; INTRAVENOUS ONCE
Status: DISCONTINUED | OUTPATIENT
Start: 2022-06-30 | End: 2022-06-30

## 2022-06-29 RX ORDER — TETRACAINE HYDROCHLORIDE 5 MG/ML
1 SOLUTION OPHTHALMIC ONCE
Status: DISCONTINUED | OUTPATIENT
Start: 2022-06-30 | End: 2022-06-30

## 2022-06-29 RX ORDER — 0.9 % SODIUM CHLORIDE 0.9 %
1000 INTRAVENOUS SOLUTION INTRAVENOUS ONCE
Status: COMPLETED | OUTPATIENT
Start: 2022-06-30 | End: 2022-06-30

## 2022-06-29 ASSESSMENT — PAIN SCALES - GENERAL: PAINLEVEL_OUTOF10: 6

## 2022-06-29 ASSESSMENT — PAIN - FUNCTIONAL ASSESSMENT: PAIN_FUNCTIONAL_ASSESSMENT: 0-10

## 2022-06-29 ASSESSMENT — PAIN DESCRIPTION - PAIN TYPE: TYPE: ACUTE PAIN

## 2022-06-30 ENCOUNTER — APPOINTMENT (OUTPATIENT)
Dept: GENERAL RADIOLOGY | Age: 37
End: 2022-06-30
Payer: MEDICAID

## 2022-06-30 VITALS
DIASTOLIC BLOOD PRESSURE: 87 MMHG | OXYGEN SATURATION: 99 % | TEMPERATURE: 98.2 F | WEIGHT: 115 LBS | BODY MASS INDEX: 21.03 KG/M2 | HEART RATE: 80 BPM | SYSTOLIC BLOOD PRESSURE: 136 MMHG | RESPIRATION RATE: 19 BRPM

## 2022-06-30 LAB
ALBUMIN SERPL-MCNC: 4.5 G/DL (ref 3.5–5.2)
ALP BLD-CCNC: 72 U/L (ref 35–104)
ALT SERPL-CCNC: 16 U/L (ref 0–32)
ANION GAP SERPL CALCULATED.3IONS-SCNC: 12 MMOL/L (ref 7–16)
AST SERPL-CCNC: 24 U/L (ref 0–31)
BASOPHILS ABSOLUTE: 0.06 E9/L (ref 0–0.2)
BASOPHILS RELATIVE PERCENT: 0.6 % (ref 0–2)
BILIRUB SERPL-MCNC: 0.4 MG/DL (ref 0–1.2)
BUN BLDV-MCNC: 18 MG/DL (ref 6–20)
CALCIUM SERPL-MCNC: 9.5 MG/DL (ref 8.6–10.2)
CHLORIDE BLD-SCNC: 101 MMOL/L (ref 98–107)
CO2: 26 MMOL/L (ref 22–29)
CREAT SERPL-MCNC: 0.6 MG/DL (ref 0.5–1)
D DIMER: <200 NG/ML DDU
EKG ATRIAL RATE: 77 BPM
EKG P AXIS: -24 DEGREES
EKG P-R INTERVAL: 112 MS
EKG Q-T INTERVAL: 364 MS
EKG QRS DURATION: 74 MS
EKG QTC CALCULATION (BAZETT): 411 MS
EKG R AXIS: 99 DEGREES
EKG T AXIS: 71 DEGREES
EKG VENTRICULAR RATE: 77 BPM
EOSINOPHILS ABSOLUTE: 0.65 E9/L (ref 0.05–0.5)
EOSINOPHILS RELATIVE PERCENT: 6.3 % (ref 0–6)
GFR AFRICAN AMERICAN: >60
GFR NON-AFRICAN AMERICAN: >60 ML/MIN/1.73
GLUCOSE BLD-MCNC: 78 MG/DL (ref 74–99)
HCT VFR BLD CALC: 37 % (ref 34–48)
HEMOGLOBIN: 12.6 G/DL (ref 11.5–15.5)
IMMATURE GRANULOCYTES #: 0.02 E9/L
IMMATURE GRANULOCYTES %: 0.2 % (ref 0–5)
LYMPHOCYTES ABSOLUTE: 2.64 E9/L (ref 1.5–4)
LYMPHOCYTES RELATIVE PERCENT: 25.7 % (ref 20–42)
MCH RBC QN AUTO: 30.4 PG (ref 26–35)
MCHC RBC AUTO-ENTMCNC: 34.1 % (ref 32–34.5)
MCV RBC AUTO: 89.4 FL (ref 80–99.9)
MONOCYTES ABSOLUTE: 0.67 E9/L (ref 0.1–0.95)
MONOCYTES RELATIVE PERCENT: 6.5 % (ref 2–12)
NEUTROPHILS ABSOLUTE: 6.24 E9/L (ref 1.8–7.3)
NEUTROPHILS RELATIVE PERCENT: 60.7 % (ref 43–80)
PDW BLD-RTO: 12.1 FL (ref 11.5–15)
PLATELET # BLD: 213 E9/L (ref 130–450)
PMV BLD AUTO: 10.3 FL (ref 7–12)
POTASSIUM SERPL-SCNC: 4.3 MMOL/L (ref 3.5–5)
RBC # BLD: 4.14 E12/L (ref 3.5–5.5)
SODIUM BLD-SCNC: 139 MMOL/L (ref 132–146)
TOTAL PROTEIN: 7.4 G/DL (ref 6.4–8.3)
TROPONIN, HIGH SENSITIVITY: <6 NG/L (ref 0–9)
WBC # BLD: 10.3 E9/L (ref 4.5–11.5)

## 2022-06-30 PROCEDURE — 93005 ELECTROCARDIOGRAM TRACING: CPT | Performed by: NURSE PRACTITIONER

## 2022-06-30 PROCEDURE — 80053 COMPREHEN METABOLIC PANEL: CPT

## 2022-06-30 PROCEDURE — 71045 X-RAY EXAM CHEST 1 VIEW: CPT

## 2022-06-30 PROCEDURE — 85378 FIBRIN DEGRADE SEMIQUANT: CPT

## 2022-06-30 PROCEDURE — 96374 THER/PROPH/DIAG INJ IV PUSH: CPT

## 2022-06-30 PROCEDURE — 36415 COLL VENOUS BLD VENIPUNCTURE: CPT

## 2022-06-30 PROCEDURE — 84484 ASSAY OF TROPONIN QUANT: CPT

## 2022-06-30 PROCEDURE — 2580000003 HC RX 258: Performed by: NURSE PRACTITIONER

## 2022-06-30 PROCEDURE — 6360000002 HC RX W HCPCS: Performed by: NURSE PRACTITIONER

## 2022-06-30 PROCEDURE — 96375 TX/PRO/DX INJ NEW DRUG ADDON: CPT

## 2022-06-30 PROCEDURE — 85025 COMPLETE CBC W/AUTO DIFF WBC: CPT

## 2022-06-30 RX ORDER — KETOROLAC TROMETHAMINE 30 MG/ML
30 INJECTION, SOLUTION INTRAMUSCULAR; INTRAVENOUS ONCE
Status: COMPLETED | OUTPATIENT
Start: 2022-06-30 | End: 2022-06-30

## 2022-06-30 RX ORDER — METHOCARBAMOL 500 MG/1
500 TABLET, FILM COATED ORAL 4 TIMES DAILY
Qty: 40 TABLET | Refills: 0 | Status: SHIPPED | OUTPATIENT
Start: 2022-06-30 | End: 2022-07-10

## 2022-06-30 RX ORDER — ORPHENADRINE CITRATE 30 MG/ML
60 INJECTION INTRAMUSCULAR; INTRAVENOUS ONCE
Status: COMPLETED | OUTPATIENT
Start: 2022-06-30 | End: 2022-06-30

## 2022-06-30 RX ORDER — NAPROXEN 500 MG/1
500 TABLET ORAL 2 TIMES DAILY PRN
Qty: 28 TABLET | Refills: 0 | Status: SHIPPED | OUTPATIENT
Start: 2022-06-30

## 2022-06-30 RX ADMIN — ORPHENADRINE CITRATE 60 MG: 30 INJECTION INTRAMUSCULAR; INTRAVENOUS at 00:36

## 2022-06-30 RX ADMIN — KETOROLAC TROMETHAMINE 30 MG: 30 INJECTION, SOLUTION INTRAMUSCULAR; INTRAVENOUS at 00:38

## 2022-06-30 RX ADMIN — SODIUM CHLORIDE 1000 ML: 9 INJECTION, SOLUTION INTRAVENOUS at 00:35

## 2022-06-30 ASSESSMENT — PAIN SCALES - GENERAL
PAINLEVEL_OUTOF10: 9
PAINLEVEL_OUTOF10: 3

## 2022-06-30 NOTE — ED PROVIDER NOTES
Independent   HPI:  6/30/22, Time: 12:07 AM EDT         Carol Pimentel is a 43 y.o. female presenting to the ED for right shoulder blade pain that radiates around into the front of her chest.  Patient reports that 3 days ago she woke from her sleep with this pain. Patient reports pain worsens with any type of movement but also worsens with a deep breath then. Patient denies any fall or injury. She denies any recent travel as well as no unusual lower extremity swelling. Patient does report that the pain is sharp and stabbing at times with certain body movements and that it will take her breath away but otherwise no actual shortness of breath. Patient reports taken over-the-counter meds without complete relief. Patient reports she is a smoker and she has been coughing, just harsh dry cough but at times clear sputum. Patient denies any abdominal pain as well as no unusual nausea, vomiting or diarrhea. Patient denies any fevers. Denies any cardiac history except for being a smoker. Patient with symptoms moderate in severity and persistent  Review of Systems:   A complete review of systems was performed and pertinent positives and negatives are stated within HPI, all other systems reviewed and are negative.          --------------------------------------------- PAST HISTORY ---------------------------------------------  Past Medical History:  has a past medical history of Abscess, Bipolar 1 disorder [address] [address] Chronic neck pain, Migraines, and Scoliosis. Past Surgical History:  has a past surgical history that includes hernia repair. Social History:  reports that she has been smoking cigarettes. She has a 7.50 pack-year smoking history. She has never used smokeless tobacco. She reports current alcohol use. She reports that she does not use drugs. Family History: family history includes Cancer in her paternal grandfather and paternal grandmother; Heart Disease in her maternal grandmother.      The patients home medications have been reviewed. Allergies: Patient has no known allergies.     -------------------------------------------------- RESULTS -------------------------------------------------  All laboratory and radiology results have been personally reviewed by myself   LABS:  Results for orders placed or performed during the hospital encounter of 06/29/22   Troponin   Result Value Ref Range    Troponin, High Sensitivity <6 0 - 9 ng/L   CBC with Auto Differential   Result Value Ref Range    WBC 10.3 4.5 - 11.5 E9/L    RBC 4.14 3.50 - 5.50 E12/L    Hemoglobin 12.6 11.5 - 15.5 g/dL    Hematocrit 37.0 34.0 - 48.0 %    MCV 89.4 80.0 - 99.9 fL    MCH 30.4 26.0 - 35.0 pg    MCHC 34.1 32.0 - 34.5 %    RDW 12.1 11.5 - 15.0 fL    Platelets 745 882 - 332 E9/L    MPV 10.3 7.0 - 12.0 fL    Neutrophils % 60.7 43.0 - 80.0 %    Immature Granulocytes % 0.2 0.0 - 5.0 %    Lymphocytes % 25.7 20.0 - 42.0 %    Monocytes % 6.5 2.0 - 12.0 %    Eosinophils % 6.3 (H) 0.0 - 6.0 %    Basophils % 0.6 0.0 - 2.0 %    Neutrophils Absolute 6.24 1.80 - 7.30 E9/L    Immature Granulocytes # 0.02 E9/L    Lymphocytes Absolute 2.64 1.50 - 4.00 E9/L    Monocytes Absolute 0.67 0.10 - 0.95 E9/L    Eosinophils Absolute 0.65 (H) 0.05 - 0.50 E9/L    Basophils Absolute 0.06 0.00 - 0.20 E9/L   Comprehensive Metabolic Panel   Result Value Ref Range    Sodium 139 132 - 146 mmol/L    Potassium 4.3 3.5 - 5.0 mmol/L    Chloride 101 98 - 107 mmol/L    CO2 26 22 - 29 mmol/L    Anion Gap 12 7 - 16 mmol/L    Glucose 78 74 - 99 mg/dL    BUN 18 6 - 20 mg/dL    CREATININE 0.6 0.5 - 1.0 mg/dL    GFR Non-African American >60 >=60 mL/min/1.73    GFR African American >60     Calcium 9.5 8.6 - 10.2 mg/dL    Total Protein 7.4 6.4 - 8.3 g/dL    Albumin 4.5 3.5 - 5.2 g/dL    Total Bilirubin 0.4 0.0 - 1.2 mg/dL    Alkaline Phosphatase 72 35 - 104 U/L    ALT 16 0 - 32 U/L    AST 24 0 - 31 U/L   D-Dimer, Quantitative   Result Value Ref Range    D-Dimer, Quant <200 ng/mL [facility] RADIOLOGY:  Interpreted by Radiologist.  XR CHEST PORTABLE   Final Result   No acute cardiopulmonary process. ------------------------- NURSING NOTES AND VITALS REVIEWED ---------------------------   The nursing notes within the ED encounter and vital signs as below have been reviewed. BP 95/69   Pulse 88   Temp 98 °F (36.7 °C)   Resp 18   Wt 115 lb (52.2 kg)   SpO2 99%   BMI 21.03 kg/m²   Oxygen Saturation Interpretation: Normal      ---------------------------------------------------PHYSICAL EXAM--------------------------------------      Constitutional/General: Alert and oriented x3, mildly uncomfortable  Head: Normocephalic and atraumatic  Eyes: PERRL, EOMI  Mouth: Oropharynx clear, handling secretions, no trismus  Neck: Supple, full ROM,   Pulmonary: Lungs clear to auscultation bilaterally, no wheezes, rales, or rhonchi. Not in respiratory distress, patient with point tenderness to right shoulder blade as well as right lateral intercostal rib spaces as well as pain notable/point tenderness to the right anterior chest.  Lungs clear but diminished to bases. Cardiovascular:  Regular rate and rhythm, no murmurs, gallops, or rubs. 2+ distal pulses  Abdomen: Soft, non tender, non distended,   Extremities: Moves all extremities x 4. Warm and well perfused  Skin: warm and dry without rash  Neurologic: GCS 15,  Psych: Normal Affect      ------------------------------ ED COURSE/MEDICAL DECISION MAKING----------------------  Medications   0.9 % sodium chloride bolus (1,000 mLs IntraVENous New Bag 6/30/22 0035)   ketorolac (TORADOL) injection 30 mg (30 mg IntraVENous Given 6/30/22 0038)   orphenadrine (NORFLEX) injection 60 mg (60 mg IntraVENous Given 6/30/22 0036)         ED COURSE:       Medical Decision Making: Plan will be for labs will also obtain imaging and medicate for symptom relief. Twelve-lead EKG interpreted showing heart rate of 77, normal sinus rhythm.   No acute ST elevation or depression noted. Right axis deviation. This was compared to most recent EKG from August 2020. Labs resulted troponin negative, CBC unremarkable, chemistry panel all within normal limits, D-dimer negative, chest x-ray negative. Patient was provided with 1 L normal saline, IV Toradol and IV Norflex. On reevaluation she reports overall marked improvement. Patient reports she is able to take a deep breath then easier and on reevaluation no further point tenderness. I do not suspect any acute coronary syndrome. Patient with point tenderness consistent with costochondritis. As well as cough for worsening pleurisy. Patient educated on good follow-up care as well as when to return back to the emergency department and portance of smoking cessation. Patient neurovascular and hemodynamically intact. Pulse ox 99%. Patient expressed understanding patient will be discharged home with prescription for Naprosyn as well as Robaxin. She was educated follow-up with her doctor within 3 to 5 days as well as strict return precautions. Patient expressed understanding and safely discharged home       Counseling: The emergency provider has spoken with the patient and discussed todays results, in addition to providing specific details for the plan of care and counseling regarding the diagnosis and prognosis. Questions are answered at this time and they are agreeable with the plan.      --------------------------------- IMPRESSION AND DISPOSITION ---------------------------------    IMPRESSION  1. Costochondritis, acute    2. Pleurisy        DISPOSITION  Disposition: Discharge to home  Patient condition is good      NOTE: This report was transcribed using voice recognition software.  Every effort was made to ensure accuracy; however, inadvertent computerized transcription errors may be present         SOLOMON Llamas CNP  06/30/22 0142  ATTENDING PROVIDER ATTESTATION:     Supervising Physician, on-site, available

## 2022-06-30 NOTE — ED NOTES
pt is A&Ox4,per ED provider pt condition has improved and stable for discharge. Pt speech is clear and coherent with pt speaking in full sentances. pt resp are even and unlabored, no use of accessory muscles noted. Verbal and written discharge instructions reviewed with pt. Pt verbalized understanding of DX,TX and importance of follow up. Pt instructed to return to ED as needed for persistent or worsening symptoms or any new concerns. Pt is leaving ambulatory with steady gait and belongings.         Byron Serna RN  06/30/22 7547

## 2022-08-24 ENCOUNTER — HOSPITAL ENCOUNTER (EMERGENCY)
Age: 37
Discharge: HOME | End: 2022-08-24
Payer: MEDICAID

## 2022-08-24 VITALS
WEIGHT: 98.3 LBS | BODY MASS INDEX: 17.98 KG/M2 | SYSTOLIC BLOOD PRESSURE: 112 MMHG | RESPIRATION RATE: 17 BRPM | TEMPERATURE: 98.5 F | DIASTOLIC BLOOD PRESSURE: 83 MMHG | OXYGEN SATURATION: 98 % | HEART RATE: 85 BPM

## 2022-08-24 LAB
ACETAMINOPHEN LEVEL: <5 MCG/ML (ref 10–30)
ALBUMIN SERPL-MCNC: 5.2 G/DL (ref 3.5–5.2)
ALP BLD-CCNC: 74 U/L (ref 35–104)
ALT SERPL-CCNC: 13 U/L (ref 0–32)
AMPHETAMINE SCREEN, URINE: NOT DETECTED
ANION GAP SERPL CALCULATED.3IONS-SCNC: 13 MMOL/L (ref 7–16)
AST SERPL-CCNC: 15 U/L (ref 0–31)
BACTERIA: ABNORMAL /HPF
BARBITURATE SCREEN URINE: NOT DETECTED
BASOPHILS ABSOLUTE: 0.02 E9/L (ref 0–0.2)
BASOPHILS RELATIVE PERCENT: 0.2 % (ref 0–2)
BENZODIAZEPINE SCREEN, URINE: NOT DETECTED
BILIRUB SERPL-MCNC: 0.8 MG/DL (ref 0–1.2)
BILIRUBIN URINE: ABNORMAL
BLOOD, URINE: ABNORMAL
BUN BLDV-MCNC: 27 MG/DL (ref 6–20)
CALCIUM SERPL-MCNC: 10 MG/DL (ref 8.6–10.2)
CANNABINOID SCREEN URINE: POSITIVE
CHLORIDE BLD-SCNC: 103 MMOL/L (ref 98–107)
CLARITY: CLEAR
CO2: 26 MMOL/L (ref 22–29)
COCAINE METABOLITE SCREEN URINE: POSITIVE
COLOR: ABNORMAL
CREAT SERPL-MCNC: 0.6 MG/DL (ref 0.5–1)
EOSINOPHILS ABSOLUTE: 0 E9/L (ref 0.05–0.5)
EOSINOPHILS RELATIVE PERCENT: 0 % (ref 0–6)
ETHANOL: <10 MG/DL (ref 0–0.08)
FENTANYL SCREEN, URINE: POSITIVE
GFR AFRICAN AMERICAN: >60
GFR NON-AFRICAN AMERICAN: >60 ML/MIN/1.73
GLUCOSE BLD-MCNC: 117 MG/DL (ref 74–99)
GLUCOSE URINE: NEGATIVE MG/DL
HCG, URINE, POC: NEGATIVE
HCT VFR BLD CALC: 40.8 % (ref 34–48)
HEMOGLOBIN: 14.9 G/DL (ref 11.5–15.5)
IMMATURE GRANULOCYTES #: 0.05 E9/L
IMMATURE GRANULOCYTES %: 0.4 % (ref 0–5)
INR BLD: 1.2
KETONES, URINE: ABNORMAL MG/DL
LACTIC ACID: 1.3 MMOL/L (ref 0.5–2.2)
LEUKOCYTE ESTERASE, URINE: ABNORMAL
LIPASE: 76 U/L (ref 13–60)
LYMPHOCYTES ABSOLUTE: 1.65 E9/L (ref 1.5–4)
LYMPHOCYTES RELATIVE PERCENT: 13.9 % (ref 20–42)
Lab: ABNORMAL
Lab: NORMAL
MCH RBC QN AUTO: 31.6 PG (ref 26–35)
MCHC RBC AUTO-ENTMCNC: 36.5 % (ref 32–34.5)
MCV RBC AUTO: 86.4 FL (ref 80–99.9)
METHADONE SCREEN, URINE: NOT DETECTED
MONOCYTES ABSOLUTE: 0.7 E9/L (ref 0.1–0.95)
MONOCYTES RELATIVE PERCENT: 5.9 % (ref 2–12)
MUCUS: PRESENT /LPF
NEGATIVE QC PASS/FAIL: NORMAL
NEUTROPHILS ABSOLUTE: 9.43 E9/L (ref 1.8–7.3)
NEUTROPHILS RELATIVE PERCENT: 79.6 % (ref 43–80)
NITRITE, URINE: NEGATIVE
OPIATE SCREEN URINE: NOT DETECTED
OXYCODONE URINE: NOT DETECTED
PDW BLD-RTO: 11.7 FL (ref 11.5–15)
PH UA: 6.5 (ref 5–9)
PHENCYCLIDINE SCREEN URINE: NOT DETECTED
PLATELET # BLD: 278 E9/L (ref 130–450)
PMV BLD AUTO: 10.8 FL (ref 7–12)
POSITIVE QC PASS/FAIL: NORMAL
POTASSIUM REFLEX MAGNESIUM: 3.6 MMOL/L (ref 3.5–5)
PROTEIN UA: 100 MG/DL
PROTHROMBIN TIME: 13.7 SEC (ref 9.3–12.4)
RBC # BLD: 4.72 E12/L (ref 3.5–5.5)
RBC UA: ABNORMAL /HPF (ref 0–2)
SALICYLATE, SERUM: <0.3 MG/DL (ref 0–30)
SODIUM BLD-SCNC: 142 MMOL/L (ref 132–146)
SPECIFIC GRAVITY UA: 1.02 (ref 1–1.03)
TOTAL PROTEIN: 7.8 G/DL (ref 6.4–8.3)
TRICYCLIC ANTIDEPRESSANTS SCREEN SERUM: NEGATIVE NG/ML
UROBILINOGEN, URINE: 1 E.U./DL
WBC # BLD: 11.9 E9/L (ref 4.5–11.5)
WBC UA: ABNORMAL /HPF (ref 0–5)

## 2022-08-24 PROCEDURE — 82077 ASSAY SPEC XCP UR&BREATH IA: CPT

## 2022-08-24 PROCEDURE — 80179 DRUG ASSAY SALICYLATE: CPT

## 2022-08-24 PROCEDURE — 83690 ASSAY OF LIPASE: CPT

## 2022-08-24 PROCEDURE — 4500000002 HC ER NO CHARGE

## 2022-08-24 PROCEDURE — 80053 COMPREHEN METABOLIC PANEL: CPT

## 2022-08-24 PROCEDURE — 80307 DRUG TEST PRSMV CHEM ANLYZR: CPT

## 2022-08-24 PROCEDURE — 85610 PROTHROMBIN TIME: CPT

## 2022-08-24 PROCEDURE — 80143 DRUG ASSAY ACETAMINOPHEN: CPT

## 2022-08-24 PROCEDURE — 85025 COMPLETE CBC W/AUTO DIFF WBC: CPT

## 2022-08-24 PROCEDURE — 81001 URINALYSIS AUTO W/SCOPE: CPT

## 2022-08-24 PROCEDURE — 83605 ASSAY OF LACTIC ACID: CPT

## 2022-08-24 ASSESSMENT — PAIN SCALES - GENERAL: PAINLEVEL_OUTOF10: 5

## 2022-08-24 ASSESSMENT — PAIN - FUNCTIONAL ASSESSMENT: PAIN_FUNCTIONAL_ASSESSMENT: 0-10

## 2022-08-24 ASSESSMENT — PAIN DESCRIPTION - LOCATION: LOCATION: ABDOMEN

## 2022-08-24 NOTE — ED NOTES
Department of Emergency Medicine  FIRST PROVIDER TRIAGE NOTE             Independent MLP           8/24/22  1:27 AM EDT    Date of Encounter: 8/24/22   MRN: [id number]      HPI: Katlyn Mast is a 43 y.o. 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 Has been having severe body aches and N/V x 4 days. Last use 4 days ago. ROS: Negative for cp or sob. PE: Gen Appearance/Constitutional: alert  Musculoskeletal: moves all extremities x 4     Initial Plan of Care: All treatment areas with department are currently occupied. Plan to order/Initiate the following while awaiting opening in ED: labs.   Initiate Treatment-Testing, Proceed toTreatment Area When Bed Available for ED Attending/MLP to Continue Care    Electronically signed by Wilfredo Garcia PA-C   DD: 8/24/22       Wilfredo Garcia PA-C  08/24/22 0131

## 2022-08-24 NOTE — ED NOTES
Called for patient no answer.  Checked bathroom patient not present      Ashley Ricci RN  08/24/22 3722

## 2023-03-27 ENCOUNTER — HOSPITAL ENCOUNTER (EMERGENCY)
Age: 38
Discharge: HOME | End: 2023-03-27
Attending: EMERGENCY MEDICINE
Payer: MEDICAID

## 2023-03-27 VITALS
HEART RATE: 98 BPM | SYSTOLIC BLOOD PRESSURE: 103 MMHG | DIASTOLIC BLOOD PRESSURE: 90 MMHG | TEMPERATURE: 98.1 F | WEIGHT: 108 LBS | HEIGHT: 62 IN | RESPIRATION RATE: 19 BRPM | BODY MASS INDEX: 19.88 KG/M2 | OXYGEN SATURATION: 98 %

## 2023-03-27 DIAGNOSIS — L03.213 PERIORBITAL CELLULITIS OF LEFT EYE: ICD-10-CM

## 2023-03-27 DIAGNOSIS — H10.9 CONJUNCTIVITIS OF LEFT EYE, UNSPECIFIED CONJUNCTIVITIS TYPE: Primary | ICD-10-CM

## 2023-03-27 PROCEDURE — 6370000000 HC RX 637 (ALT 250 FOR IP): Performed by: EMERGENCY MEDICINE

## 2023-03-27 PROCEDURE — 99283 EMERGENCY DEPT VISIT LOW MDM: CPT

## 2023-03-27 RX ORDER — AMOXICILLIN AND CLAVULANATE POTASSIUM 875; 125 MG/1; MG/1
1 TABLET, FILM COATED ORAL ONCE
Status: COMPLETED | OUTPATIENT
Start: 2023-03-27 | End: 2023-03-27

## 2023-03-27 RX ORDER — TETRACAINE HYDROCHLORIDE 5 MG/ML
2 SOLUTION OPHTHALMIC ONCE
Status: COMPLETED | OUTPATIENT
Start: 2023-03-27 | End: 2023-03-27

## 2023-03-27 RX ORDER — TOBRAMYCIN 3 MG/ML
2 SOLUTION/ DROPS OPHTHALMIC ONCE
Status: COMPLETED | OUTPATIENT
Start: 2023-03-27 | End: 2023-03-27

## 2023-03-27 RX ORDER — AMOXICILLIN AND CLAVULANATE POTASSIUM 875; 125 MG/1; MG/1
1 TABLET, FILM COATED ORAL 2 TIMES DAILY
Qty: 20 TABLET | Refills: 0 | Status: SHIPPED | OUTPATIENT
Start: 2023-03-27 | End: 2023-04-06

## 2023-03-27 RX ORDER — SULFAMETHOXAZOLE AND TRIMETHOPRIM 800; 160 MG/1; MG/1
1 TABLET ORAL ONCE
Status: DISCONTINUED | OUTPATIENT
Start: 2023-03-27 | End: 2023-03-27 | Stop reason: HOSPADM

## 2023-03-27 RX ORDER — SULFAMETHOXAZOLE AND TRIMETHOPRIM 800; 160 MG/1; MG/1
1 TABLET ORAL 2 TIMES DAILY
Qty: 20 TABLET | Refills: 0 | Status: SHIPPED | OUTPATIENT
Start: 2023-03-27 | End: 2023-04-06

## 2023-03-27 RX ORDER — MOXIFLOXACIN 5 MG/ML
1 SOLUTION/ DROPS OPHTHALMIC 3 TIMES DAILY
Qty: 1 EACH | Refills: 0 | Status: SHIPPED | OUTPATIENT
Start: 2023-03-27 | End: 2023-04-03

## 2023-03-27 RX ADMIN — FLUORESCEIN SODIUM 1 MG: 1 STRIP OPHTHALMIC at 08:08

## 2023-03-27 RX ADMIN — AMOXICILLIN AND CLAVULANATE POTASSIUM 1 TABLET: 875; 125 TABLET, FILM COATED ORAL at 08:08

## 2023-03-27 RX ADMIN — TOBRAMYCIN 2 DROP: 3 SOLUTION/ DROPS OPHTHALMIC at 08:41

## 2023-03-27 RX ADMIN — SULFAMETHOXAZOLE AND TRIMETHOPRIM 1 TABLET: 800; 160 TABLET ORAL at 08:08

## 2023-03-27 RX ADMIN — TETRACAINE HYDROCHLORIDE 2 DROP: 5 SOLUTION OPHTHALMIC at 08:08

## 2023-03-27 ASSESSMENT — PAIN - FUNCTIONAL ASSESSMENT: PAIN_FUNCTIONAL_ASSESSMENT: 0-10

## 2023-03-27 ASSESSMENT — PAIN SCALES - GENERAL: PAINLEVEL_OUTOF10: 9

## 2023-03-27 NOTE — ED PROVIDER NOTES
appointment as soon as possible for a visit       [address] [facility] Emergency Department  Alondra Hardy [phone]    If symptoms worsen    Yi Oliveros MD  [address] [place] [address] [phone]    Schedule an appointment as soon as possible for a visit       DISCHARGE MEDICATIONS:  New Prescriptions    AMOXICILLIN-CLAVULANATE (AUGMENTIN) 875-125 MG PER TABLET    Take 1 tablet by mouth 2 times daily for 10 days    MOXIFLOXACIN (VIGAMOX) 0.5 % OPHTHALMIC SOLUTION    Place 1 drop into the left eye 3 times daily for 7 days    SULFAMETHOXAZOLE-TRIMETHOPRIM (BACTRIM DS) 800-160 MG PER TABLET    Take 1 tablet by mouth 2 times daily for 10 days       DISCONTINUED MEDICATIONS:  Discontinued Medications    No medications on file              (Please note that portions of this note were completed with a voice recognition program.  Efforts were made to edit the dictations but occasionally words are mis-transcribed.)    Margarita Ireland MD (electronically signed)            Jaylen Rudolph MD  03/27/23 3912

## 2023-03-27 NOTE — DISCHARGE INSTRUCTIONS
Return immediately if your symptoms do not improve in the next 24 hours. If they worsen at all return sooner. Use the eyedrops as directed. Take all antibiotics as directed.

## 2024-04-06 NOTE — ED PROVIDER NOTES
ED Attending  CC: Andressa       Department of Emergency Medicine   ED  Provider Note  Admit Date/RoomTime: 9/25/2020  7:24 PM  ED Room: 02/02  Chief Complaint   Abscess (on buttocks;)    History of Present Illness   Source of history provided by:  patient. History/Exam Limitations: none. Gee De León is a 38 y.o. old female who has a past medical history of:   Past Medical History:   Diagnosis Date    Bipolar 1 disorder ([address]     Chronic neck pain     Migraines     Scoliosis     presents to the emergency department for complaint of abscess to buttocks. Patient reports that she noticed soreness to her right buttocks for the past 3 to 4 days. States that she was staying in a hotel 5 days ago and was unsure if she was bit by a bug. States that her aunt attempted to open the abscess earlier today, and recommended that she come to the emergency department. Patient reports intermittent drainage from the site. Reports chills without any documented fevers. Denies nausea, vomiting, back pain, dysuria, urinary frequency, hematuria, abdominal pain, chest pain, shortness of breath, lightheadedness, dizziness, syncope, or any other complaints at this time. Denies DM. Denies illicit drug use, including IV drug use. Since onset the symptoms have been worsening, associated with pain and chills and moderate in severity. She has a history of no prior episodes. ROS   Pertinent positives and negatives are stated within HPI, all other systems reviewed and are negative. Past Surgical History:   Procedure Laterality Date    HERNIA REPAIR      ventral hernia repair 3 years ago   Social History:  reports that she has been smoking cigarettes. She has a 7.50 pack-year smoking history. She has never used smokeless tobacco. She reports current alcohol use. She reports that she does not use drugs.   Family History: family history includes Cancer in her paternal grandfather and paternal grandmother; Heart Disease in her Platelets 251 530 - 976 E9/L    MPV 10.3 7.0 - 12.0 fL    Neutrophils % 72.2 43.0 - 80.0 %    Immature Granulocytes % 0.5 0.0 - 5.0 %    Lymphocytes % 19.3 (L) 20.0 - 42.0 %    Monocytes % 6.5 2.0 - 12.0 %    Eosinophils % 1.2 0.0 - 6.0 %    Basophils % 0.3 0.0 - 2.0 %    Neutrophils Absolute 10.75 (H) 1.80 - 7.30 E9/L    Immature Granulocytes # 0.07 E9/L    Lymphocytes Absolute 2.87 1.50 - 4.00 E9/L    Monocytes Absolute 0.97 (H) 0.10 - 0.95 E9/L    Eosinophils Absolute 0.18 0.05 - 0.50 E9/L    Basophils Absolute 0.05 0.00 - 0.20 E9/L   Comprehensive Metabolic Panel w/ Reflex to MG   Result Value Ref Range    Sodium 135 132 - 146 mmol/L    Potassium reflex Magnesium 3.5 3.5 - 5.0 mmol/L    Chloride 98 98 - 107 mmol/L    CO2 29 22 - 29 mmol/L    Anion Gap 8 7 - 16 mmol/L    Glucose 101 (H) 74 - 99 mg/dL    BUN 17 6 - 20 mg/dL    CREATININE 0.7 0.5 - 1.0 mg/dL    GFR Non-African American >60 >=60 mL/min/1.73    GFR African American >60     Calcium 9.0 8.6 - 10.2 mg/dL    Total Protein 6.8 6.4 - 8.3 g/dL    Alb 3.7 3.5 - 5.2 g/dL    Total Bilirubin 0.4 0.0 - 1.2 mg/dL    Alkaline Phosphatase 64 35 - 104 U/L    ALT 5 0 - 32 U/L    AST 12 0 - 31 U/L   Lactic Acid, Plasma   Result Value Ref Range    Lactic Acid 1.6 0.5 - 2.2 mmol/L   Pregnancy, Urine   Result Value Ref Range    HCG(Urine) Pregnancy Test NEGATIVE NEGATIVE   Urinalysis   Result Value Ref Range    Color, UA Yellow Straw/Yellow    Clarity, UA Clear Clear    Glucose, Ur Negative Negative mg/dL    Bilirubin Urine Negative Negative    Ketones, Urine Negative Negative mg/dL    Specific Gravity, UA <=1.005 1.005 - 1.030    Blood, Urine TRACE-INTACT Negative    pH, UA 6.0 5.0 - 9.0    Protein, UA Negative Negative mg/dL    Urobilinogen, Urine 0.2 <2.0 E.U./dL    Nitrite, Urine Negative Negative    Leukocyte Esterase, Urine SMALL (A) Negative   Magnesium   Result Value Ref Range    Magnesium 1.8 1.6 - 2.6 mg/dL   Microscopic Urinalysis   Result Value Ref Range WBC, UA 2-5 0 - 5 /HPF    RBC, UA NONE 0 - 2 /HPF    Bacteria, UA NONE SEEN None Seen /HPF     Imaging: All Radiology results interpreted by Radiologist unless otherwise noted. CT ABDOMEN PELVIS W IV CONTRAST Additional Contrast? None   Final Result   1. Findings for dermatocellulitis in the more posterior inferior   aspect of the right and left gluteal region with extension into the   perivulvar regions more on the right. 2. No abscess formation seen. 3. No air in the soft tissues. ED Course / Medical Decision Making     Medications   piperacillin-tazobactam (ZOSYN) 4.5 g in sodium chloride 0.9 % 100 mL IVPB (mini-bag) (has no administration in time range)   ceFAZolin (ANCEF) 2-3 GM-%(50ML) IVPB (duplex) (  Canceled Entry 9/25/20 2311)   piperacillin-tazobactam (ZOSYN) 4.5 (4-0.5) g injection (has no administration in time range)   0.9 % sodium chloride bolus (0 mLs Intravenous Stopped 9/25/20 2201)   fentaNYL (SUBLIMAZE) injection 25 mcg (25 mcg Intravenous Given 9/25/20 2050)   ondansetron (ZOFRAN) injection 4 mg (4 mg Intravenous Given 9/25/20 2050)   iopamidol (ISOVUE-370) 76 % injection 100 mL (100 mLs Intravenous Given 9/25/20 2211)   sodium chloride flush 0.9 % injection 10 mL (10 mLs Intravenous Given 9/25/20 2211)   acetaminophen (TYLENOL) tablet 650 mg (650 mg Oral Given 9/25/20 2226)   ketorolac (TORADOL) injection 15 mg (15 mg Intravenous Given 9/25/20 2227)        Re-examination:  9/25/20       Patients symptoms have improved after treatment. 2330: spoke with Dr Sven Zavaleta at [facility] and he accepts transfer  Consult(s):   IP CONSULT TO GENERAL SURGERY    Procedure(s):   none    MDM:      Migue Brian is a 68-year-old female who presented to the emergency department for concern of abscess to her buttocks. Onset of symptoms 3 to 4 days ago. She reported that she was concerned that she was bit by a bug at a hotel. No history of DM. No history of IV drug abuse. none

## 2024-05-01 ENCOUNTER — HOSPITAL ENCOUNTER (EMERGENCY)
Age: 39
Discharge: LEFT BEFORE DISCHARGE | End: 2024-05-01
Attending: EMERGENCY MEDICINE
Payer: MEDICAID

## 2024-05-01 VITALS
SYSTOLIC BLOOD PRESSURE: 131 MMHG | HEART RATE: 94 BPM | WEIGHT: 116 LBS | OXYGEN SATURATION: 98 % | TEMPERATURE: 97 F | RESPIRATION RATE: 20 BRPM | HEIGHT: 62 IN | DIASTOLIC BLOOD PRESSURE: 89 MMHG | BODY MASS INDEX: 21.35 KG/M2

## 2024-05-01 DIAGNOSIS — M54.50 LOW BACK PAIN, UNSPECIFIED BACK PAIN LATERALITY, UNSPECIFIED CHRONICITY, UNSPECIFIED WHETHER SCIATICA PRESENT: Primary | ICD-10-CM

## 2024-05-01 LAB
BILIRUB UR QL STRIP: NEGATIVE
BUN BLD-MCNC: 26 MG/DL (ref 6–20)
CHLORIDE BLD-SCNC: 111 MMOL/L (ref 100–108)
CK SERPL-CCNC: 38 U/L (ref 20–180)
CLARITY UR: CLEAR
CO2 BLD CALC-SCNC: 25 MMOL/L (ref 22–29)
COLOR UR: YELLOW
CREAT BLD-MCNC: 0.4 MG/DL (ref 0.5–1)
EGFR, POC: >90 ML/MIN/1.73M2
GLUCOSE BLD-MCNC: 91 MG/DL (ref 74–99)
GLUCOSE UR STRIP-MCNC: NEGATIVE MG/DL
HCG UR QL: NEGATIVE
HGB UR QL STRIP.AUTO: NEGATIVE
KETONES UR STRIP-MCNC: NEGATIVE MG/DL
LEUKOCYTE ESTERASE UR QL STRIP: NEGATIVE
NITRITE UR QL STRIP: NEGATIVE
PH UR STRIP: 6.5 [PH] (ref 5–9)
POC ANION GAP: 8 MMOL/L (ref 7–16)
POTASSIUM BLD-SCNC: 4.7 MMOL/L (ref 3.5–5)
PROT UR STRIP-MCNC: NEGATIVE MG/DL
RBC #/AREA URNS HPF: NORMAL /HPF
SODIUM BLD-SCNC: 144 MMOL/L (ref 132–146)
SP GR UR STRIP: 1.02 (ref 1–1.03)
UROBILINOGEN UR STRIP-ACNC: 0.2 EU/DL (ref 0–1)
WBC #/AREA URNS HPF: NORMAL /HPF

## 2024-05-01 PROCEDURE — 80051 ELECTROLYTE PANEL: CPT

## 2024-05-01 PROCEDURE — 99284 EMERGENCY DEPT VISIT MOD MDM: CPT

## 2024-05-01 PROCEDURE — 82947 ASSAY GLUCOSE BLOOD QUANT: CPT

## 2024-05-01 PROCEDURE — 81001 URINALYSIS AUTO W/SCOPE: CPT

## 2024-05-01 PROCEDURE — 6370000000 HC RX 637 (ALT 250 FOR IP)

## 2024-05-01 PROCEDURE — 82565 ASSAY OF CREATININE: CPT

## 2024-05-01 PROCEDURE — 84703 CHORIONIC GONADOTROPIN ASSAY: CPT

## 2024-05-01 PROCEDURE — 82550 ASSAY OF CK (CPK): CPT

## 2024-05-01 PROCEDURE — 96372 THER/PROPH/DIAG INJ SC/IM: CPT

## 2024-05-01 PROCEDURE — 6360000002 HC RX W HCPCS

## 2024-05-01 PROCEDURE — 84520 ASSAY OF UREA NITROGEN: CPT

## 2024-05-01 RX ORDER — ORPHENADRINE CITRATE 30 MG/ML
60 INJECTION INTRAMUSCULAR; INTRAVENOUS ONCE
Status: COMPLETED | OUTPATIENT
Start: 2024-05-01 | End: 2024-05-01

## 2024-05-01 RX ORDER — KETOROLAC TROMETHAMINE 30 MG/ML
30 INJECTION, SOLUTION INTRAMUSCULAR; INTRAVENOUS ONCE
Status: COMPLETED | OUTPATIENT
Start: 2024-05-01 | End: 2024-05-01

## 2024-05-01 RX ORDER — PREDNISONE 20 MG/1
60 TABLET ORAL ONCE
Status: COMPLETED | OUTPATIENT
Start: 2024-05-01 | End: 2024-05-01

## 2024-05-01 RX ADMIN — KETOROLAC TROMETHAMINE 30 MG: 30 INJECTION, SOLUTION INTRAMUSCULAR at 08:42

## 2024-05-01 RX ADMIN — ORPHENADRINE CITRATE 60 MG: 30 INJECTION, SOLUTION INTRAMUSCULAR; INTRAVENOUS at 08:42

## 2024-05-01 RX ADMIN — PREDNISONE 60 MG: 20 TABLET ORAL at 08:42

## 2024-05-01 ASSESSMENT — PAIN DESCRIPTION - FREQUENCY: FREQUENCY: CONTINUOUS

## 2024-05-01 ASSESSMENT — PAIN DESCRIPTION - LOCATION: LOCATION: BACK

## 2024-05-01 ASSESSMENT — PAIN DESCRIPTION - PAIN TYPE: TYPE: ACUTE PAIN

## 2024-05-01 ASSESSMENT — PAIN - FUNCTIONAL ASSESSMENT: PAIN_FUNCTIONAL_ASSESSMENT: 0-10

## 2024-05-01 ASSESSMENT — PAIN SCALES - GENERAL: PAINLEVEL_OUTOF10: 10

## 2024-05-01 ASSESSMENT — PAIN DESCRIPTION - DESCRIPTORS: DESCRIPTORS: ACHING;BURNING;STABBING;THROBBING;SHOOTING

## 2024-05-01 NOTE — ED PROVIDER NOTES
Eleonora Townsend is a 38 y.o. female    HPI  Eleonora Townsend is a 38 y.o. female presenting to the ED for Back Pain (Back pain/ spasms onset 8 days ago ongoing since previous back injury \"pain radiates up and down both my legs and haven't been able to sleep more then 20 minutes everyday\"; denies chest pain sob vomiting diarrhea/ pt unable to sit still during triage ststed \"muscle relaxers don't work for me, a long time ago they would give me morphine\")    History comes primarily from the patient.  Patient presents to the emergency department for acute on chronic low back pain.  Patient says she has had low back pain ongoing for the last 8 days since she had a slip and fall in the [place].  The patient does have chronic low back pain that has been going on since she had a tailbone fracture when she was 6 years old.  Over the last 8 days, the patient has had spasms in her legs she has been unable to sleep and the back pain has been severe.  She has not been taking anything significant and minimal over-the-counter medication.  Patient denies any IV drug use history, urinary incontinence or fevers.  She is able to ambulate and has no saddle paresthesias.  Patient has been getting acupuncture in her ears for her back pain but says it is not working anymore.  She also talks about some of her past medical history including toenails and broken toes that have nothing to do with the visit today.    The patient does have a history of what appears to be cocaine and cannabis abuse possibly fentanyl as well though this may have been given in a hospital stay.  This drug screen is noted from 2022.  The patient has a history of bipolar 1 disorder and is not taking the Depakote or Latuda that was previously prescribed.  She also mentions that she is double double jointed.      ROS  Full review of systems completed.  Pertinent positives and negatives per the HPI, unless otherwise stated ROS is negative.    Physical Exam  Vitals and nursing

## 2024-05-01 NOTE — ED PROVIDER NOTES
ATTENDING PROVIDER ATTESTATION:     Eleonora Townsend presented to the emergency department for evaluation of Back Pain (Back pain/ spasms onset 8 days ago ongoing since previous back injury \"pain radiates up and down both my legs and haven't been able to sleep more then 20 minutes everyday\"; denies chest pain sob vomiting diarrhea/ pt unable to sit still during triage ststed \"muscle relaxers don't work for me, a long time ago they would give me morphine\")   and was initially evaluated by the Medical Resident. See Original ED Note for H&P and ED course above.     I have reviewed and discussed the case, including pertinent history (medical, surgical, family and social) and exam findings with the Medical Resident assigned to Eleonora Townsend.  I have personally performed and/or participated in the history, exam, medical decision making, and procedures and agree with all pertinent clinical information and any additional changes or corrections are noted below in my assessment and plan. I have discussed this patient in detail with the resident, and provided the instruction and education,       I have reviewed my findings and recommendations with the assigned Medical Resident, Eleonora Townsend and members of family present at the time of disposition.      I have performed a history and physical examination of this patient and directly supervised the resident caring for this patient              [facility] EMERGENCY DEPARTMENT  EMERGENCY DEPARTMENT ENCOUNTER        Pt Name: Eleonora Townsend  MRN: [id number]  Birthdate 1985  Date of evaluation: 5/1/2024  Provider: Lopez Rodriguez MD  PCP: Gustabo Duran MD  Note Started: 7:33 AM EDT 5/1/24    CHIEF COMPLAINT       Chief Complaint   Patient presents with    Back Pain     Back pain/ spasms onset 8 days ago ongoing since previous back injury \"pain radiates up and down both my legs and haven't been able to sleep more then 20 minutes everyday\"; denies chest pain sob

## 2024-05-24 ENCOUNTER — CLINICAL DOCUMENTATION (OUTPATIENT)
Dept: GENERAL RADIOLOGY | Age: 39
End: 2024-05-24

## 2024-05-24 NOTE — PROGRESS NOTES
Scheduling attempted 4 times to schedule patient:    4-16 Voice mail not set up  4-22-24 Voice mail not set up  5-14-24 Number not in service ([phone])  5-24-24 Other number on chart, also not in service ([phone])    Notified ordering provider.